# Patient Record
Sex: FEMALE | Race: WHITE | Employment: UNEMPLOYED | URBAN - NONMETROPOLITAN AREA
[De-identification: names, ages, dates, MRNs, and addresses within clinical notes are randomized per-mention and may not be internally consistent; named-entity substitution may affect disease eponyms.]

---

## 2019-07-22 ENCOUNTER — OUTSIDE SERVICES (OUTPATIENT)
Dept: PRIMARY CARE CLINIC | Age: 79
End: 2019-07-22
Payer: MEDICARE

## 2019-07-22 DIAGNOSIS — K21.9 GASTROESOPHAGEAL REFLUX DISEASE, ESOPHAGITIS PRESENCE NOT SPECIFIED: ICD-10-CM

## 2019-07-22 DIAGNOSIS — E78.2 MIXED HYPERLIPIDEMIA: ICD-10-CM

## 2019-07-22 DIAGNOSIS — J45.20 MILD INTERMITTENT ASTHMA WITHOUT COMPLICATION: Primary | ICD-10-CM

## 2019-07-22 DIAGNOSIS — E11.9 TYPE 2 DIABETES MELLITUS WITHOUT COMPLICATION, WITHOUT LONG-TERM CURRENT USE OF INSULIN (HCC): ICD-10-CM

## 2019-07-22 PROCEDURE — 99214 OFFICE O/P EST MOD 30 MIN: CPT | Performed by: FAMILY MEDICINE

## 2019-09-30 ENCOUNTER — OUTSIDE SERVICES (OUTPATIENT)
Dept: PRIMARY CARE CLINIC | Age: 79
End: 2019-09-30
Payer: MEDICARE

## 2019-09-30 DIAGNOSIS — M25.552 PAIN OF LEFT HIP: Primary | ICD-10-CM

## 2019-09-30 DIAGNOSIS — E78.5 HYPERLIPIDEMIA, UNSPECIFIED HYPERLIPIDEMIA TYPE: ICD-10-CM

## 2019-09-30 DIAGNOSIS — I10 ESSENTIAL HYPERTENSION: ICD-10-CM

## 2019-09-30 DIAGNOSIS — E11.9 CONTROLLED TYPE 2 DIABETES MELLITUS WITHOUT COMPLICATION, WITHOUT LONG-TERM CURRENT USE OF INSULIN (HCC): ICD-10-CM

## 2019-09-30 PROCEDURE — 99214 OFFICE O/P EST MOD 30 MIN: CPT | Performed by: FAMILY MEDICINE

## 2019-09-30 NOTE — PROGRESS NOTES
session: Not on file    Stress: Not on file   Relationships    Social connections:     Talks on phone: Not on file     Gets together: Not on file     Attends Mosque service: Not on file     Active member of club or organization: Not on file     Attends meetings of clubs or organizations: Not on file     Relationship status: Not on file    Intimate partner violence:     Fear of current or ex partner: Not on file     Emotionally abused: Not on file     Physically abused: Not on file     Forced sexual activity: Not on file   Other Topics Concern    Not on file   Social History Narrative    Not on file     Past Surgical History:   Procedure Laterality Date    CATARACT REMOVAL WITH IMPLANT      left x 2    EYE SURGERY  2/21/2012    ppv,laser,gas fluid exchange left    HYSTERECTOMY  1980's    WRIST SURGERY      right      No family history on file. There were no vitals filed for this visit. Exam: Const: Appears healthy and well developed. No signs of acute distress present. Eyes: PERRL  ENMT: Tympanic membranes are intact. Nasal mucosa intact without noted erythema Septum is in the midline. Posterior pharynx shows no exudate, irritation or redness. Neck:  Supple without adenopathy. Adequate range of motion   Resp: No rales, rhonchi, wheezes appreciated over the lungs bilaterally. CV: S1, S2 within normal limits. Regular rate and rhythm noted. Without murmur, gallop or rub. Extremities:  Pulses intact. Without noted edema. Abdomen: Positive bowel sounds. Palpation reveals softness, with no distension, organomegaly or tenderness. No abdominal masses palpable. Skin: Skin is warm and dry. Musculo: Unchanged upon examination  Neuro: Alert and oriented X3. Cranial nerves grossly intact. Psych: Mood is normal.  Affect is normal.   Vital signs reviewed. Controlled Substances Monitoring:     No flowsheet data found.      Plan Per Assessment:  Kathleen Phan was seen today for joint

## 2019-10-11 DIAGNOSIS — M25.552 PAIN OF LEFT HIP: ICD-10-CM

## 2019-11-04 ENCOUNTER — OUTSIDE SERVICES (OUTPATIENT)
Dept: PRIMARY CARE CLINIC | Age: 79
End: 2019-11-04
Payer: MEDICARE

## 2019-11-04 DIAGNOSIS — R42 VERTIGO: Primary | ICD-10-CM

## 2019-11-04 PROCEDURE — 99213 OFFICE O/P EST LOW 20 MIN: CPT | Performed by: FAMILY MEDICINE

## 2020-01-20 ENCOUNTER — OUTSIDE SERVICES (OUTPATIENT)
Dept: FAMILY MEDICINE CLINIC | Age: 80
End: 2020-01-20
Payer: MEDICARE

## 2020-01-20 PROCEDURE — 99214 OFFICE O/P EST MOD 30 MIN: CPT | Performed by: FAMILY MEDICINE

## 2020-01-20 NOTE — PROGRESS NOTES
2020     Lorayne Carrel    : 1940 Sex: female   Age: [de-identified] y.o. Chief Complaint   Patient presents with    Diabetes    Asthma    Hyperlipidemia    Gastroesophageal Reflux       HPI: This [de-identified]y.o. -year-old female  presents today for evaluation and management of her  chronic medical problems. Current medication list reviewed. The patient is tolerating all medications well without adverse events or known side effects. The patient does understand the risk and benefits of the prescribed medications. The patient is up-to-date on all age-appropriate wellness issues. The patient states she recently saw her dentist and has a pending tooth extraction versus root canal.      ROS:   Const: Denies changes in appetite, chills, fever, night sweats and weight loss. Eyes:  Denies discharge, a recent change in visual acuity, blurred vision and double vision. ENMT: Denies discharge of the ears, hearing loss, pain of the ears. Denies nasal or sinus symptoms other than stated above. Denies mouth or throat symptoms. CV:  Denies chest pain, dyspnea on exertion, orthopnea, palpitations and PND  Resp: Denies chest pain, cough, SOB and wheezing. GI: Denies abdominal pain, constipation, diarrhea, heartburn, indigestion, nausea and vomiting. : Denies dysuria, frequency, hematuria, nocturia and urgency. Musculo: Denies arthralgias and myalgia  Skin:  Denies lesions, pruritus and rash. Neuro: Denies dizziness, lightheadedness, numbness, tingling and weakness. Psych:  Denies anxiety and depression  Endocrine: Denies anxiety and depression. Hema/Lymph: Denies hematologic symptoms  Allergy/Immuno:  Denies allergic/immunologic symptoms. Pertinent positives reviewed and noted      Current Outpatient Medications:     meclizine (ANTIVERT) 12.5 MG tablet, Take 12.5 mg by mouth 3 times daily as needed, Disp: , Rfl:     albuterol (VENTOLIN HFA) 108 (90 BASE) MCG/ACT inhaler, Inhale 2 puffs into the lungs 2 times daily. Physical activity:     Days per week: Not on file     Minutes per session: Not on file    Stress: Not on file   Relationships    Social connections:     Talks on phone: Not on file     Gets together: Not on file     Attends Anabaptism service: Not on file     Active member of club or organization: Not on file     Attends meetings of clubs or organizations: Not on file     Relationship status: Not on file    Intimate partner violence:     Fear of current or ex partner: Not on file     Emotionally abused: Not on file     Physically abused: Not on file     Forced sexual activity: Not on file   Other Topics Concern    Not on file   Social History Narrative    Not on file     Past Surgical History:   Procedure Laterality Date    CATARACT REMOVAL WITH IMPLANT      left x 2    EYE SURGERY  2/21/2012    ppv,laser,gas fluid exchange left    HYSTERECTOMY  1980's    WRIST SURGERY      right      No family history on file. There were no vitals filed for this visit. Exam: Const: Appears healthy and well developed. No signs of acute distress present. Eyes: PERRL  ENMT: Tympanic membranes are intact. Nasal mucosa intact without noted erythema Septum is in the midline. Posterior pharynx shows no exudate, irritation or redness. Neck:  Supple without adenopathy. Adequate range of motion   Resp: No rales, rhonchi, wheezes appreciated over the lungs bilaterally. CV: S1, S2 within normal limits. Regular rate and rhythm noted. Without murmur, gallop or rub. Extremities:  Pulses intact. Without noted edema. Abdomen: Positive bowel sounds. Palpation reveals softness, with no distension, organomegaly or tenderness. No abdominal masses palpable. Skin: Skin is warm and dry. Musculo: Unchanged upon examination. Neuro: Alert and oriented X3. Cranial nerves grossly intact. Psych: Mood is normal.  Affect is normal.   Vital signs reviewed. Controlled Substances Monitoring:     No flowsheet data found.

## 2020-04-20 ENCOUNTER — VIRTUAL VISIT (OUTPATIENT)
Dept: PRIMARY CARE CLINIC | Age: 80
End: 2020-04-20
Payer: MEDICARE

## 2020-04-20 PROCEDURE — G2012 BRIEF CHECK IN BY MD/QHP: HCPCS | Performed by: FAMILY MEDICINE

## 2020-04-20 RX ORDER — MONTELUKAST SODIUM 10 MG/1
TABLET ORAL
COMMUNITY
Start: 2020-04-11 | End: 2020-07-20 | Stop reason: SDUPTHER

## 2020-04-20 RX ORDER — DILTIAZEM HYDROCHLORIDE 60 MG/1
TABLET, FILM COATED ORAL
COMMUNITY
Start: 2020-04-06 | End: 2020-07-20 | Stop reason: SDUPTHER

## 2020-04-20 RX ORDER — MIRABEGRON 25 MG/1
TABLET, FILM COATED, EXTENDED RELEASE ORAL
COMMUNITY
Start: 2020-04-18 | End: 2020-07-20 | Stop reason: SDUPTHER

## 2020-04-20 ASSESSMENT — PATIENT HEALTH QUESTIONNAIRE - PHQ9
SUM OF ALL RESPONSES TO PHQ QUESTIONS 1-9: 2
SUM OF ALL RESPONSES TO PHQ QUESTIONS 1-9: 2
1. LITTLE INTEREST OR PLEASURE IN DOING THINGS: 1
2. FEELING DOWN, DEPRESSED OR HOPELESS: 1
SUM OF ALL RESPONSES TO PHQ9 QUESTIONS 1 & 2: 2
DEPRESSION UNABLE TO ASSESS: FUNCTIONAL CAPACITY MOTIVATION LIMITS ACCURACY

## 2020-04-20 NOTE — PROGRESS NOTES
Zenaida Garibay is a 79 y.o. female evaluated via telephone on 4/20/2020. Consent:  She and/or health care decision maker is aware that that she may receive a bill for this telephone service, depending on her insurance coverage, and has provided verbal consent to proceed: Yes      Documentation:  I communicated with the patient and/or health care decision maker about hypertension, hyperlipidemia, diabetes and rash. .   Details of this discussion including any medical advice provided: This 79-year-old female presents today for follow-up evaluation of her chronic medical problems including those noted as above. The patient also states she is having issues with a rash of the bilateral foot region. The patient is requesting a podiatry referral.  The patient denies any associated pruritus. Current medication list reviewed. The patient is tolerating all medications well without adverse events or known side effects. The patient is up-to-date on all age-appropriate wellness issues. Assessment #1 hypertension #2 hyperlipidemia #3 diabetes number 4 foot dermatitis plan #1 podiatry referral #2 schedule office visit follow-up in 3 months. I affirm this is a Patient Initiated Episode with an Established Patient who has not had a related appointment within my department in the past 7 days or scheduled within the next 24 hours. Total Time: minutes: 5-10 minutes.     Note: not billable if this call serves to triage the patient into an appointment for the relevant concern      Taqueria Alberto

## 2020-07-20 ENCOUNTER — VIRTUAL VISIT (OUTPATIENT)
Dept: PRIMARY CARE CLINIC | Age: 80
End: 2020-07-20
Payer: MEDICARE

## 2020-07-20 PROCEDURE — 99442 PR PHYS/QHP TELEPHONE EVALUATION 11-20 MIN: CPT | Performed by: FAMILY MEDICINE

## 2020-07-20 RX ORDER — ALBUTEROL SULFATE 90 UG/1
2 AEROSOL, METERED RESPIRATORY (INHALATION) 2 TIMES DAILY
Qty: 1 INHALER | Refills: 5 | Status: SHIPPED
Start: 2020-07-20 | End: 2021-06-01

## 2020-07-20 RX ORDER — MIRABEGRON 25 MG/1
25 TABLET, FILM COATED, EXTENDED RELEASE ORAL DAILY
Qty: 30 TABLET | Refills: 5 | Status: SHIPPED | OUTPATIENT
Start: 2020-07-20 | End: 2020-08-19

## 2020-07-20 RX ORDER — DILTIAZEM HYDROCHLORIDE 60 MG/1
2 TABLET, FILM COATED ORAL DAILY
Qty: 1 INHALER | Refills: 5 | Status: SHIPPED
Start: 2020-07-20 | End: 2021-06-01

## 2020-07-20 RX ORDER — MONTELUKAST SODIUM 10 MG/1
10 TABLET ORAL NIGHTLY
Qty: 30 TABLET | Refills: 5 | Status: SHIPPED
Start: 2020-07-20 | End: 2021-06-01

## 2020-07-20 RX ORDER — MECLIZINE HCL 12.5 MG/1
12.5 TABLET ORAL 3 TIMES DAILY PRN
Qty: 90 TABLET | Refills: 5 | Status: SHIPPED | OUTPATIENT
Start: 2020-07-20 | End: 2020-08-19

## 2020-07-20 NOTE — PROGRESS NOTES
Samuel Doyle is a 79 y.o. female evaluated via telephone on 7/20/2020. Consent:  She and/or health care decision maker is aware that that she may receive a bill for this telephone service, depending on her insurance coverage, and has provided verbal consent to proceed: Yes      Documentation:  I communicated with the patient and/or health care decision maker about evaluation management of chronic medical problems. Details of this discussion including any medical advice provided: This 76-year-old female presents today for follow-up evaluation and management of her chronic medical problems. Current medication list reviewed. The patient is tolerating all medications well without adverse events or known side effects. The patient is not up-to-date on all age-appropriate wellness issues. Assessment #1 diabetes #2 GERD #3 hyperlipidemia plan #1 continue current medical therapy #2 schedule office visit follow-up in 3 months. I affirm this is a Patient Initiated Episode with a Patient who has not had a related appointment within my department in the past 7 days or scheduled within the next 24 hours. Patient identification was verified at the start of the visit: Yes    Total Time: minutes: 11-20 minutes.   Note: not billable if this call serves to triage the patient into an appointment for the relevant concern      Ruddy Dykes

## 2020-12-01 LAB
AVERAGE GLUCOSE: NORMAL
BASOPHILS ABSOLUTE: NORMAL
BASOPHILS RELATIVE PERCENT: NORMAL
CHOLESTEROL, TOTAL: NORMAL
CHOLESTEROL/HDL RATIO: NORMAL
EOSINOPHILS ABSOLUTE: NORMAL
EOSINOPHILS RELATIVE PERCENT: NORMAL
HBA1C MFR BLD: 6.4 %
HCT VFR BLD CALC: NORMAL %
HDLC SERPL-MCNC: NORMAL MG/DL
HEMOGLOBIN: NORMAL
LDL CHOLESTEROL CALCULATED: NORMAL
LYMPHOCYTES ABSOLUTE: NORMAL
LYMPHOCYTES RELATIVE PERCENT: NORMAL
MCH RBC QN AUTO: NORMAL PG
MCHC RBC AUTO-ENTMCNC: NORMAL G/DL
MCV RBC AUTO: NORMAL FL
MONOCYTES ABSOLUTE: NORMAL
MONOCYTES RELATIVE PERCENT: NORMAL
NEUTROPHILS ABSOLUTE: NORMAL
NEUTROPHILS RELATIVE PERCENT: NORMAL
NONHDLC SERPL-MCNC: NORMAL MG/DL
PDW BLD-RTO: NORMAL %
PLATELET # BLD: NORMAL 10*3/UL
PMV BLD AUTO: NORMAL FL
RBC # BLD: NORMAL 10*6/UL
TRIGL SERPL-MCNC: NORMAL MG/DL
VLDLC SERPL CALC-MCNC: NORMAL MG/DL
WBC # BLD: NORMAL 10*3/UL

## 2020-12-14 ENCOUNTER — OUTSIDE SERVICES (OUTPATIENT)
Dept: FAMILY MEDICINE CLINIC | Age: 80
End: 2020-12-14
Payer: MEDICARE

## 2020-12-14 PROBLEM — J45.20 MILD INTERMITTENT ASTHMA WITHOUT COMPLICATION: Status: ACTIVE | Noted: 2020-12-14

## 2020-12-14 NOTE — PROGRESS NOTES
2020     Marina Pathak    : 1940 Sex: female   Age: 79 y.o. Chief Complaint   Patient presents with    Hyperlipidemia    Diabetes    Gastroesophageal Reflux    Other       HPI: This 79y.o. -year-old female  presents today for evaluation and management of her  chronic medical problems. Current medication list reviewed. The patient is tolerating all medications well without adverse events or known side effects. The patient does understand the risk and benefits of the prescribed medications. The patient is not up-to-date on all age-appropriate wellness issues. The patient is concerned about cognitive impairment. The patient states she does have trouble finding words. ROS:   Const: Denies changes in appetite, chills, fever, night sweats and weight loss. Eyes:  Denies discharge, a recent change in visual acuity, blurred vision and double vision. ENMT: Denies discharge of the ears, hearing loss, pain of the ears. Denies nasal or sinus symptoms other than stated above. Denies mouth or throat symptoms. CV:  Denies chest pain, dyspnea on exertion, orthopnea, palpitations and PND  Resp: Denies chest pain, cough, SOB and wheezing. GI: Denies abdominal pain, constipation, diarrhea, heartburn, indigestion, nausea and vomiting. : Denies dysuria, frequency, hematuria, nocturia and urgency. Musculo: Denies arthralgias and myalgia  Skin:  Denies lesions, pruritus and rash. Neuro: Denies dizziness, lightheadedness, numbness, tingling and weakness. Psych:  Denies anxiety and depression  Endocrine: Denies anxiety and depression. Hema/Lymph: Denies hematologic symptoms  Allergy/Immuno:  Denies allergic/immunologic symptoms.   Pertinent positives reviewed and noted      Current Outpatient Medications:     montelukast (SINGULAIR) 10 MG tablet, Take 1 tablet by mouth nightly, Disp: 30 tablet, Rfl: 5    SYMBICORT 80-4.5 MCG/ACT AERO, Inhale 2 puffs into the lungs daily, Disp: 1 Inhaler, Rfl: 5    albuterol sulfate HFA (VENTOLIN HFA) 108 (90 Base) MCG/ACT inhaler, Inhale 2 puffs into the lungs 2 times daily Use am of surgery Inhale 2 puffs into the lungs 2 times daily. Use am of surgery, Disp: 1 Inhaler, Rfl: 5    fluticasone-salmeterol (ADVAIR DISKUS) 500-50 MCG/DOSE diskus inhaler, Inhale 1 puff into the lungs daily. Use am of surgery States \"supposed to use twice daily, only uses once\" , Disp: , Rfl:     omeprazole (PRILOSEC) 20 MG capsule, Take 20 mg by mouth daily. Take am of surgery, 02/21/2012, with 1 ounce water , Disp: , Rfl:     theophylline CR, 12 hour, (THEOPHYLLINE CR, 12 HOUR,) 300 MG CR tablet, Take 300 mg by mouth 2 times daily. Take am of surgery, 02/21/2012 , Disp: , Rfl:     loratadine (CLARITIN) 10 MG tablet, Take 10 mg by mouth daily. , Disp: , Rfl:     oxybutynin (DITROPAN-XL) 10 MG CR tablet, Take 10 mg by mouth daily.   , Disp: , Rfl:     simvastatin (ZOCOR) 20 MG tablet, Take 20 mg by mouth nightly.  , Disp: , Rfl:     Allergies   Allergen Reactions    Pcn [Penicillins] Other (See Comments)     Oral form--\"whole insides shook\"    Sulfa Antibiotics Other (See Comments)     seizure       Past Medical History:   Diagnosis Date    Acid reflux     Asthma     Diabetes mellitus ([facility] [address]     diet controlled    Difficulty waking 1980's    postop hysterectomy     Hyperlipidemia     Normal nuclear stress test     per pt, 5 years ago    Skin cancer     face, 4 years ago    Stress incontinence, female     Wears glasses      Social History     Socioeconomic History    Marital status: Single     Spouse name: Not on file    Number of children: Not on file    Years of education: Not on file    Highest education level: Not on file   Occupational History    Not on file   Social Needs    Financial resource strain: Not on file    Food insecurity     Worry: Not on file     Inability: Not on file    Transportation needs     Medical: Not on file     Non-medical: Not on file Tobacco Use    Smoking status: Never Smoker    Smokeless tobacco: Never Used   Substance and Sexual Activity    Alcohol use: No    Drug use: No    Sexual activity: Not on file   Lifestyle    Physical activity     Days per week: Not on file     Minutes per session: Not on file    Stress: Not on file   Relationships    Social connections     Talks on phone: Not on file     Gets together: Not on file     Attends Druze service: Not on file     Active member of club or organization: Not on file     Attends meetings of clubs or organizations: Not on file     Relationship status: Not on file    Intimate partner violence     Fear of current or ex partner: Not on file     Emotionally abused: Not on file     Physically abused: Not on file     Forced sexual activity: Not on file   Other Topics Concern    Not on file   Social History Narrative    Not on file     Past Surgical History:   Procedure Laterality Date    CATARACT REMOVAL WITH IMPLANT      left x 2    EYE SURGERY  2/21/2012    ppv,laser,gas fluid exchange left    HYSTERECTOMY  1980's    WRIST SURGERY      right      History reviewed. No pertinent family history. There were no vitals filed for this visit. Exam: Const: Appears healthy and well developed. No signs of acute distress present. Eyes: PERRL  ENMT: Tympanic membranes are intact. Nasal mucosa intact without noted erythema Septum is in the midline. Posterior pharynx shows no exudate, irritation or redness. Neck:  Supple without adenopathy. Adequate range of motion   Resp: No rales, rhonchi, wheezes appreciated over the lungs bilaterally. CV: S1, S2 within normal limits. Regular rate and rhythm noted. Without murmur, gallop or rub. Extremities:  Pulses intact. Without noted edema. Abdomen: Positive bowel sounds. Palpation reveals softness, with no distension, organomegaly or tenderness. No abdominal masses palpable. Skin: Skin is warm and dry. Musculo:  Unchanged upon examination  Neuro: Alert and oriented X3. Cranial nerves grossly intact. Psych: Mood is normal.  Affect is normal.   Vital signs reviewed. Controlled Substances Monitoring:     No flowsheet data found. Plan Per Assessment:  Veronica  was seen today for hyperlipidemia, diabetes, gastroesophageal reflux and other. Diagnoses and all orders for this visit:    Gastroesophageal reflux disease, unspecified whether esophagitis present    Mild intermittent asthma without complication    Controlled type 2 diabetes mellitus without complication, without long-term current use of insulin (HCC)    Mixed hyperlipidemia    Cognitive impairment      The patient scored a 13 on the verbal fluency test.  The patient will be referred to Dr. Princess Cox for cognitive impairment. Return in about 3 months (around 3/14/2021) for MEDICATION CHECK, FOLLOW UP [address] Sukhi Paniagua MD    Note was generated with the assistance of voice recognition software. Document was reviewed however may contain grammatical errors.

## 2021-01-06 ENCOUNTER — TELEPHONE (OUTPATIENT)
Dept: PRIMARY CARE CLINIC | Age: 81
End: 2021-01-06

## 2021-03-22 ENCOUNTER — OUTSIDE SERVICES (OUTPATIENT)
Dept: PRIMARY CARE CLINIC | Age: 81
End: 2021-03-22
Payer: MEDICARE

## 2021-03-22 DIAGNOSIS — J45.20 MILD INTERMITTENT ASTHMA WITHOUT COMPLICATION: Primary | ICD-10-CM

## 2021-03-22 DIAGNOSIS — K21.9 GASTROESOPHAGEAL REFLUX DISEASE, UNSPECIFIED WHETHER ESOPHAGITIS PRESENT: ICD-10-CM

## 2021-03-22 DIAGNOSIS — E78.2 MIXED HYPERLIPIDEMIA: ICD-10-CM

## 2021-03-22 DIAGNOSIS — E11.9 CONTROLLED TYPE 2 DIABETES MELLITUS WITHOUT COMPLICATION, WITHOUT LONG-TERM CURRENT USE OF INSULIN (HCC): ICD-10-CM

## 2021-03-22 NOTE — PROGRESS NOTES
3/22/2021     Raven Martin    : 1940 Sex: female   Age: 79 y.o. Chief Complaint   Patient presents with    Asthma    Hyperlipidemia    Diabetes    Gastroesophageal Reflux    Other       HPI: This 79y.o. -year-old female  presents today for evaluation and management of her  chronic medical problems. The patient states she did stop her Myrbetriq due to a lack of clinical efficacy. Current medication list reviewed. The patient is tolerating all medications well without adverse events or known side effects. The patient does understand the risk and benefits of the prescribed medications. The patient is up-to-date on all age-appropriate wellness issues. This encounter was conducted via [employer] me. ROS:   Const: Denies changes in appetite, chills, fever, night sweats and weight loss. Eyes:  Denies discharge, a recent change in visual acuity, blurred vision and double vision. ENMT: Denies discharge of the ears, hearing loss, pain of the ears. Denies nasal or sinus symptoms other than stated above. Denies mouth or throat symptoms. CV:  Denies chest pain, dyspnea on exertion, orthopnea, palpitations and PND  Resp: Denies chest pain, cough, SOB and wheezing. GI: Denies abdominal pain, constipation, diarrhea, heartburn, indigestion, nausea and vomiting. : Positive for overactive bladder symptoms. .  Musculo: Denies arthralgias and myalgia  Skin:  Denies lesions, pruritus and rash. Neuro: Denies dizziness, lightheadedness, numbness, tingling and weakness. Psych:  Denies anxiety and depression  Endocrine: Denies polyuria, polydipsia, polyphagia, weight gain, dry skin, constipation, fatigue, cold intolerance, heat intolerance or tremors. Hema/Lymph: Denies hematologic symptoms  Allergy/Immuno:  Denies allergic/immunologic symptoms.   Pertinent positives reviewed and noted      Current Outpatient Medications:     montelukast (SINGULAIR) 10 MG tablet, Take 1 tablet by mouth nightly, Disp: 30 tablet, Rfl: 5    SYMBICORT 80-4.5 MCG/ACT AERO, Inhale 2 puffs into the lungs daily, Disp: 1 Inhaler, Rfl: 5    albuterol sulfate HFA (VENTOLIN HFA) 108 (90 Base) MCG/ACT inhaler, Inhale 2 puffs into the lungs 2 times daily Use am of surgery Inhale 2 puffs into the lungs 2 times daily. Use am of surgery, Disp: 1 Inhaler, Rfl: 5    fluticasone-salmeterol (ADVAIR DISKUS) 500-50 MCG/DOSE diskus inhaler, Inhale 1 puff into the lungs daily. Use am of surgery States \"supposed to use twice daily, only uses once\" , Disp: , Rfl:     omeprazole (PRILOSEC) 20 MG capsule, Take 20 mg by mouth daily. Take am of surgery, 02/21/2012, with 1 ounce water , Disp: , Rfl:     theophylline CR, 12 hour, (THEOPHYLLINE CR, 12 HOUR,) 300 MG CR tablet, Take 300 mg by mouth 2 times daily. Take am of surgery, 02/21/2012 , Disp: , Rfl:     loratadine (CLARITIN) 10 MG tablet, Take 10 mg by mouth daily. , Disp: , Rfl:     oxybutynin (DITROPAN-XL) 10 MG CR tablet, Take 10 mg by mouth daily.   , Disp: , Rfl:     simvastatin (ZOCOR) 20 MG tablet, Take 20 mg by mouth nightly.  , Disp: , Rfl:     Allergies   Allergen Reactions    Pcn [Penicillins] Other (See Comments)     Oral form--\"whole insides shook\"    Sulfa Antibiotics Other (See Comments)     seizure       Past Medical History:   Diagnosis Date    Acid reflux     Asthma     Diabetes mellitus ([facility] [address]     diet controlled    Difficulty waking 1980's    postop hysterectomy     Hyperlipidemia     Normal nuclear stress test     per pt, 5 years ago    Skin cancer     face, 4 years ago    Stress incontinence, female     Wears glasses      Social History     Socioeconomic History    Marital status: Single     Spouse name: Not on file    Number of children: Not on file    Years of education: Not on file    Highest education level: Not on file   Occupational History    Not on file   Social Needs    Financial resource strain: Not on file    Food insecurity     Worry: Not on file Inability: Not on file    Transportation needs     Medical: Not on file     Non-medical: Not on file   Tobacco Use    Smoking status: Never Smoker    Smokeless tobacco: Never Used   Substance and Sexual Activity    Alcohol use: No    Drug use: No    Sexual activity: Not on file   Lifestyle    Physical activity     Days per week: Not on file     Minutes per session: Not on file    Stress: Not on file   Relationships    Social connections     Talks on phone: Not on file     Gets together: Not on file     Attends Mor[employer] service: Not on file     Active member of club or organization: Not on file     Attends meetings of clubs or organizations: Not on file     Relationship status: Not on file    Intimate partner violence     Fear of current or ex partner: Not on file     Emotionally abused: Not on file     Physically abused: Not on file     Forced sexual activity: Not on file   Other Topics Concern    Not on file   Social History Narrative    Not on file     Past Surgical History:   Procedure Laterality Date    CATARACT REMOVAL WITH IMPLANT      left x 2    EYE SURGERY  2/21/2012    ppv,laser,gas fluid exchange left    HYSTERECTOMY  1980's    WRIST SURGERY      right      No family history on file. Exam: Const: Appears healthy and well developed. No signs of acute distress present. Eyes: Unchanged upon examination. Neck: Without visualized mass. Resp: Normal respirations noted. Skin: Skin is warm and dry. Musculo: Unchanged upon examination. Neuro: Alert and oriented X3. Psych: Mood is normal.  Affect is normal.   Vital signs reviewed. Controlled Substances Monitoring:     No flowsheet data found. Plan Per Assessment:  Marcin Marrero was seen today for asthma, hyperlipidemia, diabetes, gastroesophageal reflux and other.     Diagnoses and all orders for this visit:    Mild intermittent asthma without complication    Gastroesophageal reflux disease, unspecified whether esophagitis present    Controlled type 2 diabetes mellitus without complication, without long-term current use of insulin ([address]    Mixed hyperlipidemia        Return in about 3 months (around 6/22/2021) for MEDICATION CHECK, FOLLOW UP CHRONIC MEDICAL PROBLEMS. Jamie Layne MD    Note was generated with the assistance of voice recognition software. Document was reviewed however may contain grammatical errors.

## 2021-06-01 ENCOUNTER — INITIAL CONSULT (OUTPATIENT)
Dept: GERIATRIC MEDICINE | Age: 81
End: 2021-06-01
Payer: MEDICARE

## 2021-06-01 VITALS
DIASTOLIC BLOOD PRESSURE: 74 MMHG | HEIGHT: 65 IN | TEMPERATURE: 97.2 F | RESPIRATION RATE: 16 BRPM | BODY MASS INDEX: 33.61 KG/M2 | HEART RATE: 86 BPM | SYSTOLIC BLOOD PRESSURE: 138 MMHG | WEIGHT: 201.7 LBS

## 2021-06-01 DIAGNOSIS — G31.84 MCI (MILD COGNITIVE IMPAIRMENT): Primary | ICD-10-CM

## 2021-06-01 PROCEDURE — 99212 OFFICE O/P EST SF 10 MIN: CPT | Performed by: INTERNAL MEDICINE

## 2021-06-01 RX ORDER — OMEPRAZOLE 40 MG/1
40 CAPSULE, DELAYED RELEASE ORAL 2 TIMES DAILY
COMMUNITY

## 2021-06-01 RX ORDER — MONTELUKAST SODIUM 10 MG/1
10 TABLET ORAL DAILY
COMMUNITY

## 2021-06-01 RX ORDER — BUDESONIDE AND FORMOTEROL FUMARATE DIHYDRATE 80; 4.5 UG/1; UG/1
2 AEROSOL RESPIRATORY (INHALATION) 2 TIMES DAILY
COMMUNITY

## 2021-06-01 RX ORDER — MECLIZINE HYDROCHLORIDE 25 MG/1
12.5 TABLET ORAL 3 TIMES DAILY PRN
COMMUNITY

## 2021-06-01 SDOH — ECONOMIC STABILITY: FOOD INSECURITY: WITHIN THE PAST 12 MONTHS, THE FOOD YOU BOUGHT JUST DIDN'T LAST AND YOU DIDN'T HAVE MONEY TO GET MORE.: NEVER TRUE

## 2021-06-01 SDOH — ECONOMIC STABILITY: FOOD INSECURITY: WITHIN THE PAST 12 MONTHS, YOU WORRIED THAT YOUR FOOD WOULD RUN OUT BEFORE YOU GOT MONEY TO BUY MORE.: NEVER TRUE

## 2021-06-01 ASSESSMENT — PATIENT HEALTH QUESTIONNAIRE - PHQ9
2. FEELING DOWN, DEPRESSED OR HOPELESS: 1
1. LITTLE INTEREST OR PLEASURE IN DOING THINGS: 1
SUM OF ALL RESPONSES TO PHQ QUESTIONS 1-9: 2
SUM OF ALL RESPONSES TO PHQ QUESTIONS 1-9: 2
SUM OF ALL RESPONSES TO PHQ9 QUESTIONS 1 & 2: 2
SUM OF ALL RESPONSES TO PHQ QUESTIONS 1-9: 2

## 2021-06-01 ASSESSMENT — SOCIAL DETERMINANTS OF HEALTH (SDOH): HOW HARD IS IT FOR YOU TO PAY FOR THE VERY BASICS LIKE FOOD, HOUSING, MEDICAL CARE, AND HEATING?: NOT HARD AT ALL

## 2021-06-01 NOTE — PATIENT INSTRUCTIONS
Patient Education        Preventing Falls: Care Instructions  Your Care Instructions     Getting around your home safely can be a challenge if you have injuries or health problems that make it easy for you to fall. Loose rugs and furniture in walkways are among the dangers for many older people who have problems walking or who have poor eyesight. People who have conditions such as arthritis, osteoporosis, or dementia also have to be careful not to fall. You can make your home safer with a few simple measures. Follow-up care is a key part of your treatment and safety. Be sure to make and go to all appointments, and call your doctor if you are having problems. It's also a good idea to know your test results and keep a list of the medicines you take. How can you care for yourself at home? Taking care of yourself  · You may get dizzy if you do not drink enough water. To prevent dehydration, drink plenty of fluids. Choose water and other caffeine-free clear liquids. If you have kidney, heart, or liver disease and have to limit fluids, talk with your doctor before you increase the amount of fluids you drink. · Exercise regularly to improve your strength, muscle tone, and balance. Walk if you can. Swimming may be a good choice if you cannot walk easily. · Have your vision and hearing checked each year or any time you notice a change. If you have trouble seeing and hearing, you might not be able to avoid objects and could lose your balance. · Know the side effects of the medicines you take. Ask your doctor or pharmacist whether the medicines you take can affect your balance. Sleeping pills or sedatives can affect your balance. · Limit the amount of alcohol you drink. Alcohol can impair your balance and other senses. · Ask your doctor whether calluses or corns on your feet need to be removed. If you wear loose-fitting shoes because of calluses or corns, you can lose your balance and fall.   · Talk to your doctor if you have numbness in your feet. Preventing falls at home  · Remove raised doorway thresholds, throw rugs, and clutter. Repair loose carpet or raised areas in the floor. · Move furniture and electrical cords to keep them out of walking paths. · Use nonskid floor wax, and wipe up spills right away, especially on ceramic tile floors. · If you use a walker or cane, put rubber tips on it. If you use crutches, clean the bottoms of them regularly with an abrasive pad, such as steel wool. · Keep your house well lit, especially Amye Medici, and outside walkways. Use night-lights in areas such as hallways and bathrooms. Add extra light switches or use remote switches (such as switches that go on or off when you clap your hands) to make it easier to turn lights on if you have to get up during the night. · Install sturdy handrails on stairways. · Move items in your cabinets so that the things you use a lot are on the lower shelves (about waist level). · Keep a cordless phone and a flashlight with new batteries by your bed. If possible, put a phone in each of the main rooms of your house, or carry a cell phone in case you fall and cannot reach a phone. Or, you can wear a device around your neck or wrist. You push a button that sends a signal for help. · Wear low-heeled shoes that fit well and give your feet good support. Use footwear with nonskid soles. Check the heels and soles of your shoes for wear. Repair or replace worn heels or soles. · Do not wear socks without shoes on wood floors. · Walk on the grass when the sidewalks are slippery. If you live in an area that gets snow and ice in the winter, sprinkle salt on slippery steps and sidewalks. Preventing falls in the bath  · Install grab bars and nonskid mats inside and outside your shower or tub and near the toilet and sinks. · Use shower chairs and bath benches. · Use a hand-held shower head that will allow you to sit while showering.   · Get into a tub or shower by putting the weaker leg in first. Get out of a tub or shower with your strong side first.  · Repair loose toilet seats and consider installing a raised toilet seat to make getting on and off the toilet easier. · Keep your bathroom door unlocked while you are in the shower. Where can you learn more? Go to https://[employer]iceweb.[employer]. org and sign in to your [employer] account. Enter [phone] in the [employer] box to learn more about \"Preventing Falls: Care Instructions. \"     If you do not have an account, please click on the \"Sign Up Now\" link. Current as of: December 7, 2020               Content Version: 12.8  © 2006-2021 Healthwise, Incorporated. Care instructions adapted under license by [facility]. If you have questions about a medical condition or this instruction, always ask your healthcare professional. [place] [address] any warranty or liability for your use of this information.

## 2021-06-15 NOTE — PROGRESS NOTES
CC Here for memory evaluation    No driving since 0868, no issues   Does own meds but hasn't driven   Able to use microwave  Graduate [employer] in education in primary education   Masters at [employer] up in [facility] in [address] [place] [address] up on [address] [facility] 3rd and  4th grade  Trouble finding words and her forte is English  And M [facility] 25   11 worse  with beginning with the letter F  4/5 5 minute memory  Impression; [facility] in a highly educated lady  Plan; [address] in 6 months if worse may consider [facility]
Chief Complaint   Patient presents with   [facility] Consultation     Referral from Dr Jarrell Lovell re: decline in mental capacity. Resides at [facility] independent living x 9 years.  Immunizations     Pt states she has rcvd both doses of the Pfizer vaccine.  Fall     History of falling, but not in the past year. Uses a cane, and a walker prn. Has dizziness & balance issues -- chronic, per pt.
2

## 2021-10-27 ENCOUNTER — OUTSIDE SERVICES (OUTPATIENT)
Dept: FAMILY MEDICINE CLINIC | Age: 81
End: 2021-10-27
Payer: MEDICARE

## 2021-10-27 DIAGNOSIS — R32 URINARY INCONTINENCE, UNSPECIFIED TYPE: ICD-10-CM

## 2021-10-27 DIAGNOSIS — R05.9 COUGH: ICD-10-CM

## 2021-10-27 DIAGNOSIS — R42 DIZZINESS: ICD-10-CM

## 2021-10-27 DIAGNOSIS — E11.9 CONTROLLED TYPE 2 DIABETES MELLITUS WITHOUT COMPLICATION, WITHOUT LONG-TERM CURRENT USE OF INSULIN (HCC): ICD-10-CM

## 2021-10-27 DIAGNOSIS — E78.2 MIXED HYPERLIPIDEMIA: Primary | ICD-10-CM

## 2021-10-27 NOTE — PROGRESS NOTES
10/27/2021    Pearle Lennox [facility]  1940    This resident is being seen today for a follow-up evaluation visit. 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 She is a 79 y.o. female resident who is being seen today for follow-up evaluation/3-month visit. Resident is fairly alert and oriented, but does have a loss of words and states that she has been seen by Dr. Anshul Vernon in times past.  She does currently have a POA who is a [employer]  by the name of Gabbi West.  Resident states that she has had a history of headaches with which she does utilize Tylenol and has had dizziness with improvement. She describes a chronic cough which is moist with phlegm production as well as constipation from time to time. She denies any current pain, sore throat, chest pain, shortness of breath, nausea or vomiting, diarrhea, dysuria or frequency, fever or chills, falls or syncopal events. She does however admit to complete incontinence of urine and has seen department of urology in times past and has been told that there is essentially nothing else that can be done to help her. We did discuss her labs with which she did have an elevated cholesterol 228 with a triglyceride of 198 and an LDL of 145 she admits that she has been on statin as well as fish oil in times past and they were stopped but she does not remember the reasoning.   I did explain to her that we would need to reevaluate her lipid profile and then furthermore discuss the plan of action thereafter. Medications:  Omeprazole 40 mg twice daily   Symbicort 80/45 2 puffs twice daily  Proair 2 puffs twice daily   Singulair 10 mg daily  Tylenol every 4 hours as needed   vitamin D 5 2000 units daily      Objective     Vital Signs: /69 pulse 78 respirations 18 temperature 96.7 O2 93% weight 198.8 pounds        Physical examination:Skin is essentially warm and dry. HEENT unremarkable. Neck is supple. Heart regular rate and rhythm. No rubs, gallops or murmurs noted. Lungs are clear to auscultation. No evidence of rhonchi, rales, or wheezing. Abdomen is soft, supple and non-tender. Bowel sounds are noted x4 quadrants. No rigidity, guarding or rebound tenderness. Negative Pearson's, negative McBurney's, negative Chris's. Extremities; she does have some degree of venous insufficiency without  true pitting edema. Pulses are adequate. No clubbing  or no cyanosis noted. Neurologically she  is alert and oriented x3. No evidence of paralysis or paresthesias noted. Diagnoses and all orders for this visit:    Mixed hyperlipidemia  Comments:  Repeat lipid panel 11/1/2021 due to elevated cholesterol 228 with triglycerides of 198    Cough  Comments:  Chronic in nature with underlying history of asthma with resident currently maintaining Symbicort with ProAir and Singulair    Dizziness  Comments:  Improving    Controlled type 2 diabetes mellitus without complication, without long-term current use of insulin ([facility])  Comments:  Dietary controlled    Urinary incontinence, unspecified type  Comments:  Seen in conjunction with urology in times past          Plan:  Plan of care was discussed with the healthcare team with medications and labs reviewed. Labs recently noted with a BUN/creatinine 24/0.78 with a GFR of 71 hemoglobin A1c of 6.4 TSH 4.06 H/H 14.2/41.7 vitamin D 51 previously of 27 cholesterol 228 HDL 49 triglycerides 198 .   Given the fact that she has not had lipid work-up since July, I am been to recommend a CBC, CMP, lipid panel and a hemoglobin A1c on 11/1/2021. I will plan to follow-up with her in the course of 3 months, pending the lab results. If her lipid panel is noted to be high, then I would like an appointment sooner to discuss plan of care/treatment. This was furthermore discussed with the resident and she was in agreements. I will furthermore maintain her plan of care at this time and plan to see her routinely and as needed with further orders forthcoming. LOVELY VINCENT, APRN - CNP      *Note was creating using voice recognition software.   The document was reviewed however grammatical errors may exist.

## 2022-01-27 ENCOUNTER — OUTSIDE SERVICES (OUTPATIENT)
Dept: FAMILY MEDICINE CLINIC | Age: 82
End: 2022-01-27
Payer: MEDICARE

## 2022-01-27 DIAGNOSIS — R23.4 THICKENING OF SKIN: ICD-10-CM

## 2022-01-27 DIAGNOSIS — R05.9 COUGH: Primary | ICD-10-CM

## 2022-01-27 DIAGNOSIS — E11.9 CONTROLLED TYPE 2 DIABETES MELLITUS WITHOUT COMPLICATION, WITHOUT LONG-TERM CURRENT USE OF INSULIN (HCC): ICD-10-CM

## 2022-01-27 DIAGNOSIS — E78.2 MIXED HYPERLIPIDEMIA: ICD-10-CM

## 2022-01-27 DIAGNOSIS — J45.909 UNCOMPLICATED ASTHMA, UNSPECIFIED ASTHMA SEVERITY, UNSPECIFIED WHETHER PERSISTENT: ICD-10-CM

## 2022-03-02 ENCOUNTER — OUTSIDE SERVICES (OUTPATIENT)
Dept: FAMILY MEDICINE CLINIC | Age: 82
End: 2022-03-02
Payer: MEDICARE

## 2022-03-02 DIAGNOSIS — K21.9 GASTROESOPHAGEAL REFLUX DISEASE, UNSPECIFIED WHETHER ESOPHAGITIS PRESENT: ICD-10-CM

## 2022-03-02 DIAGNOSIS — R26.89 IMBALANCE: Primary | ICD-10-CM

## 2022-03-02 DIAGNOSIS — J30.9 ALLERGIC RHINITIS, UNSPECIFIED SEASONALITY, UNSPECIFIED TRIGGER: ICD-10-CM

## 2022-03-02 DIAGNOSIS — H91.92 HEARING LOSS OF LEFT EAR, UNSPECIFIED HEARING LOSS TYPE: ICD-10-CM

## 2022-03-02 DIAGNOSIS — J45.20 MILD INTERMITTENT ASTHMA WITHOUT COMPLICATION: ICD-10-CM

## 2022-04-19 ENCOUNTER — TELEPHONE (OUTPATIENT)
Dept: GERIATRIC MEDICINE | Age: 82
End: 2022-04-19

## 2022-04-19 NOTE — TELEPHONE ENCOUNTER
As FYI -- Pt was seen only once, on consult in June of 2021. She called to cancel her upcoming May OV appt. States she does not wish to reschedule, wants to find a DR closer to home. Informed pt that she may call to reschedule later if she so desires.

## 2022-06-02 ENCOUNTER — OUTSIDE SERVICES (OUTPATIENT)
Dept: FAMILY MEDICINE CLINIC | Age: 82
End: 2022-06-02
Payer: MEDICARE

## 2022-06-02 DIAGNOSIS — R53.83 OTHER FATIGUE: ICD-10-CM

## 2022-06-02 DIAGNOSIS — R41.89 COGNITIVE IMPAIRMENT: ICD-10-CM

## 2022-06-02 DIAGNOSIS — E78.2 MIXED HYPERLIPIDEMIA: Primary | ICD-10-CM

## 2022-06-02 DIAGNOSIS — R32 URINARY INCONTINENCE, UNSPECIFIED TYPE: ICD-10-CM

## 2022-06-02 DIAGNOSIS — E11.9 CONTROLLED TYPE 2 DIABETES MELLITUS WITHOUT COMPLICATION, WITHOUT LONG-TERM CURRENT USE OF INSULIN (HCC): ICD-10-CM

## 2022-06-02 NOTE — PROGRESS NOTES
6/2/2022    Debbie Jack [facility]  1940    This resident is being seen today for a follow-up evaluation visit. 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 She is a 79 y.o. female resident who is being seen today for follow-up evaluation with resident indicating that she is so tired that she feels that she just cannot get anything accomplished. I did bring up the possibility of assisted living, which she absolutely refused at this time. She does indicate that she did have a physical therapy for approximately 3 weeks but she feels that it was of no help and admits that it is \"mostly in her head. \"  She denies any headaches or dizziness, sore throat, chest pain, coughing or shortness of breath, nausea or vomiting, constipation or diarrhea, dysuria or frequency, fever or chills, falls or syncopal events. Medications:  Omeprazole 40 mg twice daily   Advair 250/50 micrograms 1 inhalation twice daily   Proair 2 puffs twice daily   Singulair 10 mg daily  Tylenol every 4 hours as needed   vitamin D 5 2000 units daily  Tricor 145 mg daily  Zyrtec 10 mg daily as needed        Objective     Vital Signs: /68 pulse 85 respirations 18 temperature 96.8 O2 95% weight 195.8 pounds        Physical examination:Skin is essentially warm and dry. HEENT unremarkable. Neck is supple. Heart regular rate and rhythm. No rubs, gallops or murmurs noted. Lungs are clear to auscultation.   No evidence of rhonchi, rales, or wheezing. Abdomen is soft, supple and non-tender. Bowel sounds are noted x4 quadrants. No rigidity, guarding or rebound tenderness. Negative Pearson's, negative McBurney's, negative Chris's. Extremities; she does have some degree of venous insufficiency without  true pitting edema. She does have some degree of hemosiderin deposition to the bilateral lower extremities. Pulses are adequate. No clubbing  or no cyanosis noted. Neurologically she  is alert and oriented x3. No evidence of paralysis or paresthesias noted. Diagnoses and all orders for this visit:    Mixed hyperlipidemia  Comments:  Discontinue omega-3 and initiate Tricor 145 mg daily and repeat a lipid panel in 3 months    Other fatigue  Comments:  Obtain TSH with B12 and iron studies    Controlled type 2 diabetes mellitus without complication, without long-term current use of insulin ([facility])  Comments:  Stable at this time    Urinary incontinence, unspecified type  Comments:  Currently asymptomatic    Cognitive impairment  Comments:  Stable          Plan:  Plan of care was discussed with the healthcare team with medications and labs reviewed. Resident did have recent labs with inclusion of a lipid panel with an elevated cholesterol level of 243 previously 233 triglycerides 221 previously 201  previously 152  previously 187 BUN/creatinine 22/0.73 with a GFR 76 hemoglobin A1c of 6.5H/H14.3/41.8 with a vitamin D of 70. Given the fact that she did have changes with respect to her lipid panel, I do not feel that the omega-3 is effective. She indicates that she is not able to take statins, but she is unsure as to the reasoning behind it. She states that Dr. Angela Hernandez had taken her off the medication in years past.  And therefore can recommend discontinuation of the omega-3 and initiate Tricor 145 mg daily. I will plan to repeat a lipid panel in the course of 3 months with respect to this change.   Due to the complaints of fatigue, I will recommend a CMP, TSH, B12 and iron studies. This will not be completed for the course of 1 month, as I do want to evaluate the CMP with the changes with respect to her Tricor. I will plan to follow-up with her in the course of 3 months otherwise, unless there are changes with respect to her follow-up labs. LOVELY VINCENT, APRN - CNP      *Note was creating using voice recognition software.   The document was reviewed however grammatical errors may exist.

## 2022-06-15 ENCOUNTER — OUTSIDE SERVICES (OUTPATIENT)
Dept: FAMILY MEDICINE CLINIC | Age: 82
End: 2022-06-15
Payer: MEDICARE

## 2022-06-15 DIAGNOSIS — F03.90 PRESENILE DEMENTIA WITHOUT BEHAVIORAL DISTURBANCE (HCC): ICD-10-CM

## 2022-06-15 DIAGNOSIS — K21.9 GASTROESOPHAGEAL REFLUX DISEASE, UNSPECIFIED WHETHER ESOPHAGITIS PRESENT: ICD-10-CM

## 2022-06-15 DIAGNOSIS — J44.9 CHRONIC OBSTRUCTIVE PULMONARY DISEASE, UNSPECIFIED COPD TYPE (HCC): Primary | ICD-10-CM

## 2022-06-15 DIAGNOSIS — N32.81 OVERACTIVE BLADDER: ICD-10-CM

## 2022-06-15 DIAGNOSIS — J45.909 UNCOMPLICATED ASTHMA, UNSPECIFIED ASTHMA SEVERITY, UNSPECIFIED WHETHER PERSISTENT: ICD-10-CM

## 2022-06-15 NOTE — PROGRESS NOTES
6/15/2022    Betsy Fraga [facility]  1940    This resident is being seen today for an acute evaluation visit. 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 She is a 79 y.o. female resident who is being seen today for an acute evaluation visit secondary to concerns regarding her inhaler. This is a resident who was recently given the benefit of Advair Diskus due to the fact that she felt that Symbicort was too expensive. She presents today indicating that the Advair is highly effective but it is also too expensive. While the resident was present, the nurse did call her pharmacy and discussed this further in terms of price options. It turns out that this was just secondary to her deductible and that the medication has now been to be $30 a month from this point forward until the end of the year. This was furthermore discussed with the resident. She does indicate that this type of weather and when the air conditioner is running that it exacerbates her asthma. She denies any current chest pain, nausea or vomiting, constipation or diarrhea, dysuria or frequency, fever or chills, falls or syncopal events.             Medications:  Omeprazole 40 mg twice daily   Advair 250/50 micrograms 1 inhalation twice daily   Proair 2 puffs twice daily   Singulair 10 mg daily  Tylenol every 4 hours as needed   vitamin D 5 2000 units daily  Tricor 145 mg daily  Zyrtec 10 mg daily as needed        Objective     Vital Signs: /53 pulse 89 respirations 18 temperature 97.3 O2 95% weight 197.8 pounds        Physical examination:Skin is essentially warm and dry. HEENT unremarkable. Neck is supple. Heart regular rate and rhythm. No rubs, gallops or murmurs noted. Lungs are clear to auscultation. No evidence of rhonchi, rales, or wheezing. Abdomen is soft, supple and non-tender. Bowel sounds are noted x4 quadrants. No rigidity, guarding or rebound tenderness. Negative Pearson's, negative McBurney's, negative Chris's. Extremities; she does have some degree of venous insufficiency without  true pitting edema. She does have some degree of hemosiderin deposition to the bilateral lower extremities. Pulses are adequate. No clubbing  or no cyanosis noted. Neurologically she  is alert and oriented x3. No evidence of paralysis or paresthesias noted. 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 So after ongoing discussion with this resident, she has agreed to maintain the benefit of the Advair given the fact that it will only be $30 a month. As stated, she needed to meet her deductible, which is the reason that her previous pricing was $300. I did explain to her that she will pay $30 a month until the end of the month, and then at that time she will have to meet her deductible again. She did voice understanding in this regard.   She will furthermore be scheduled to have her shingles vaccines and keep her follow-up appointment for the course of the next 3 months. She will otherwise continue with her plan of care as stated and I will see her routinely and as needed with further orders forthcoming. LOVELY VINCENT, APRN - CNP      *Note was creating using voice recognition software.   The document was reviewed however grammatical errors may exist.

## 2022-09-21 ENCOUNTER — OUTSIDE SERVICES (OUTPATIENT)
Dept: FAMILY MEDICINE CLINIC | Age: 82
End: 2022-09-21
Payer: MEDICARE

## 2022-09-21 DIAGNOSIS — N32.81 OVERACTIVE BLADDER: ICD-10-CM

## 2022-09-21 DIAGNOSIS — G47.00 INSOMNIA, UNSPECIFIED TYPE: Primary | ICD-10-CM

## 2022-09-21 DIAGNOSIS — K21.9 GASTROESOPHAGEAL REFLUX DISEASE, UNSPECIFIED WHETHER ESOPHAGITIS PRESENT: ICD-10-CM

## 2022-09-21 DIAGNOSIS — R41.89 COGNITIVE IMPAIRMENT: ICD-10-CM

## 2022-09-21 DIAGNOSIS — R26.89 IMBALANCE: ICD-10-CM

## 2022-09-21 NOTE — PROGRESS NOTES
134/79 pulse 95 respirations 18 temperature 96.5 O2 96 % weight 200.4 pounds        Physical examination:Skin is essentially warm and dry. HEENT unremarkable. Neck is supple. Heart regular rate and rhythm. No rubs, gallops or murmurs noted. Lungs are clear to auscultation. No evidence of rhonchi, rales, or wheezing. Abdomen is soft, supple and non-tender. Bowel sounds are noted x4 quadrants. No rigidity, guarding or rebound tenderness. Negative Pearson's, negative McBurney's, negative Chris's. Extremities; she does have some degree of venous insufficiency without  true pitting edema. She does have some degree of hemosiderin deposition to the bilateral lower extremities. Pulses are adequate. No clubbing  or no cyanosis noted. Neurologically she  is alert and oriented x3. No evidence of paralysis or paresthesias noted. Diagnoses and all orders for this visit:    Insomnia, unspecified type  Comments:  Initiate Tylenol PM at bedtime as needed    Imbalance  Comments:  Status post physical therapy services. Maintain utilization of walker. Gastroesophageal reflux disease, unspecified whether esophagitis present  Comments:  Maintain omeprazole    Cognitive impairment  Comments:  Currently stable    Overactive bladder  Comments:  Currently relatively controlled without complaints at this time          Plan:  Plan of care was discussed with the healthcare team with medications and labs reviewed. Regarding what appears to be some underlying insomnia, I did recommend that she have the benefit of Tylenol PM as needed. She has otherwise been relatively stable with her current medical regimen. I will also recommend a refill of her ProAir with 2 puffs twice daily and her Advair 250/50 mcg inhalation twice daily, both of which will be called into [facility] pharmacy. I will plan to follow-up with her in the course of 1 month to reevaluate her insomnia.   We will otherwise continue forth with her current plan of care as stated and see her routinely and as needed with further orders forthcoming. LOVELY VINCENT, APRN - CNP      *Note was creating using voice recognition software.   The document was reviewed however grammatical errors may exist.

## 2022-10-20 ENCOUNTER — OUTSIDE SERVICES (OUTPATIENT)
Dept: FAMILY MEDICINE CLINIC | Age: 82
End: 2022-10-20
Payer: MEDICARE

## 2022-10-20 DIAGNOSIS — G47.00 INSOMNIA, UNSPECIFIED TYPE: Primary | ICD-10-CM

## 2022-10-20 DIAGNOSIS — R40.0 DAYTIME SLEEPINESS: ICD-10-CM

## 2022-10-20 DIAGNOSIS — N32.81 OVERACTIVE BLADDER: ICD-10-CM

## 2022-10-20 DIAGNOSIS — E78.2 MIXED HYPERLIPIDEMIA: ICD-10-CM

## 2022-10-20 DIAGNOSIS — R05.3 CHRONIC COUGH: ICD-10-CM

## 2022-10-20 NOTE — PROGRESS NOTES
10/20/2022    Yanelitunde Cao [facility]  1940    This resident is being seen today for a follow-up evaluation visit. 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 She is a 79 y.o. female resident who is being seen today for a follow-up evaluation visit regarding insomnia. It is of note to mention that she was taking Tylenol PM and she indicates that she sleeps about 3 hours before she has to get up to urinate but she is able to actually fall back asleep, which she was not able to do in times past.  She does however state that she has been relatively sleepy throughout the day. She also states that she has what she would describe as overactive bladder and states that she has been to urologist in times past and has not had any relief with respect to medications. She furthermore brought to my attention that she has had what she would describe as a deep cough, which she feels has been somewhat chronic, but wanted to bring it to my attention. She denies any headaches or dizziness, sore throat, chest pain, nausea or vomiting, constipation or diarrhea, dysuria or frequency, fever or chills, falls or syncopal events.             Medications:  Omeprazole 40 mg twice daily   Advair 250/50 micrograms 1 inhalation twice daily   Proair 2 puffs twice daily   Singulair 10 mg daily  Tylenol every 4 hours as needed   vitamin D 5 2000 units daily  Tricor 145 mg daily  Zyrtec 10 mg daily as needed  Trazodone 25 mg at bedtime  Vesicare 5 mg daily        Objective     Vital Signs: /73 pulse 86 respirations 22 temperature 97.1 O2 95% weight 200.4 pounds        Physical examination:Skin is essentially warm and dry. HEENT unremarkable. Neck is supple. Heart regular rate and rhythm. No rubs, gallops or murmurs noted. Lungs are clear to auscultation. No evidence of rhonchi, rales, or wheezing. Abdomen is soft, supple and non-tender. Bowel sounds are noted x4 quadrants. No rigidity, guarding or rebound tenderness. Negative Pearson's, negative McBurney's, negative Chris's. Extremities; she does have some degree of venous insufficiency without  true pitting edema. She does have some degree of hemosiderin deposition to the bilateral lower extremities. Pulses are adequate. No clubbing  or no cyanosis noted. Neurologically she  is alert and oriented x3. No evidence of paralysis or paresthesias noted. Diagnoses and all orders for this visit:    Insomnia, unspecified type  Comments:  Discontinue Tylenol PM and initiate trazodone 25 mg at bedtime    Overactive bladder  Comments:  Initiate Vesicare 5 mg daily    Chronic cough  Comments:  Possibly due to underlying COPD with recommendations to obtain a chest x-ray    Daytime sleepiness  Comments:  Change time of Zyrtec to at bedtime and discontinue Tylenol PM    Mixed hyperlipidemia  Comments:  Controlled with Tricor            Plan:  Plan of care was discussed with the healthcare team with medications and labs reviewed. Given the fact that she does have some underlying insomnia and she is having some daytime sleepiness, I did asked that she change her Zyrtec to be taken at nighttime. I am also going to discontinue her Tylenol PM given the fact that she is taking the Zyrtec and place her on trazodone 25 mg at bedtime. She indicates that she does not feel that she has been on Vesicare in times past for her overactive bladder so I will start her on 5 mg daily.   I am going to obtain a chest x-ray at this time given the fact that she has not had 1 in extended period of time and she is stating that she has had some degree of a chronic cough. I will otherwise plan to follow-up with her in the course of 1 month for reevaluation. She will furthermore continue with her plan of care as stated and I will see her routinely and as needed with further orders forthcoming. LOVELY VINCENT, APRN - CNP      *Note was creating using voice recognition software.   The document was reviewed however grammatical errors may exist.

## 2022-11-02 ENCOUNTER — OUTSIDE SERVICES (OUTPATIENT)
Dept: FAMILY MEDICINE CLINIC | Age: 82
End: 2022-11-02
Payer: MEDICARE

## 2022-11-02 DIAGNOSIS — N32.81 OVERACTIVE BLADDER: ICD-10-CM

## 2022-11-02 DIAGNOSIS — K21.9 GASTROESOPHAGEAL REFLUX DISEASE, UNSPECIFIED WHETHER ESOPHAGITIS PRESENT: ICD-10-CM

## 2022-11-02 DIAGNOSIS — G47.00 INSOMNIA, UNSPECIFIED TYPE: ICD-10-CM

## 2022-11-02 DIAGNOSIS — J40 BRONCHITIS: Primary | ICD-10-CM

## 2022-11-02 DIAGNOSIS — F03.90 PRESENILE DEMENTIA WITHOUT BEHAVIORAL DISTURBANCE (HCC): ICD-10-CM

## 2022-11-02 NOTE — PROGRESS NOTES
11/2/2022    Devon Edmonds [facility]  1940    This resident is being seen today for a follow-up evaluation visit. 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 She is a 79 y.o. female resident who is being seen today for an acute evaluation visit regarding a follow-up from her office visit 1 week ago regarding insomnia and what appeared to be a cough consistent with underlying rhinitis. She was given the benefit of trazodone at bedtime and had discontinued her Tylenol PM.  She states that she has been taking her Zyrtec accordingly with respect to her underlying rhinitis. She also indicates that she has been taking the Vesicare and feels that it has had some degree of improvement with respect to the fact that she has \"nothing on her pad in the morning. \"  She does admit she is having ongoing deep cough with clear phlegm production but denies shortness of breath or postnasal drip. She furthermore denies any sore throat, chest pain, nausea or vomiting, constipation or diarrhea, dysuria or frequency, fever or chills, falls or syncopal events.           Medications:  Omeprazole 40 mg twice daily   Advair 250/50 micrograms 1 inhalation twice daily   Proair 2 puffs twice daily   Singulair 10 mg daily  Tylenol every 4 hours as needed   vitamin D 5 2000 units daily  Tricor 145 mg daily  Zyrtec 10 mg daily as needed  Trazodone 25 mg at bedtime  Vesicare 5 mg daily        Objective     Vital Signs: /76 pulse 92 respirations 14 temperature 97.1 O2 95% weight 201 pounds        Physical examination:Skin is essentially warm and dry. HEENT unremarkable. Neck is supple. Heart regular rate and rhythm. No rubs, gallops or murmurs noted. Lungs are clear to auscultation. No evidence of rhonchi, rales, or wheezing. Abdomen is soft, supple and non-tender. Bowel sounds are noted x4 quadrants. No rigidity, guarding or rebound tenderness. Negative Pearson's, negative McBurney's, negative Chris's. Extremities; she does have some degree of venous insufficiency without  true pitting edema. She does have some degree of hemosiderin deposition to the bilateral lower extremities. Pulses are adequate. No clubbing  or no cyanosis noted. Neurologically she  is alert and oriented x3. No evidence of paralysis or paresthesias noted. 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 Despite the fact that she is clear to auscultation and her most recent chest x-ray was within normal limits, she is exhibiting signs and symptoms consistent with underlying bronchitis. I will therefore recommend doxycycline 100 mg twice a day for 7 days in addition to a prednisone taper of 30 mg daily for 3 days then 20 mg daily for 3 days then 10 mg daily for 3 days then 5 mg daily for 3 days with ultimate discontinuation. I did asked that she furthermore maintain the benefit of her Zyrtec.   She will otherwise continue with the trazodone for insomnia and the Vesicare for her overactive bladder and I will follow-up with her in 2 weeks regarding ongoing symptomatology. She will otherwise continue forth with her current plan of care as stated and I will see her routinely and as needed with further orders forthcoming. LOVELY VINCENT, APRN - CNP      *Note was creating using voice recognition software.   The document was reviewed however grammatical errors may exist.

## 2022-11-15 ENCOUNTER — OUTSIDE SERVICES (OUTPATIENT)
Dept: FAMILY MEDICINE CLINIC | Age: 82
End: 2022-11-15
Payer: MEDICARE

## 2022-11-15 DIAGNOSIS — E11.9 CONTROLLED TYPE 2 DIABETES MELLITUS WITHOUT COMPLICATION, WITHOUT LONG-TERM CURRENT USE OF INSULIN (HCC): ICD-10-CM

## 2022-11-15 DIAGNOSIS — J44.9 CHRONIC OBSTRUCTIVE PULMONARY DISEASE, UNSPECIFIED COPD TYPE (HCC): ICD-10-CM

## 2022-11-15 DIAGNOSIS — G47.00 INSOMNIA, UNSPECIFIED TYPE: Primary | ICD-10-CM

## 2022-11-15 DIAGNOSIS — E78.2 MIXED HYPERLIPIDEMIA: ICD-10-CM

## 2022-11-15 DIAGNOSIS — J40 BRONCHITIS: ICD-10-CM

## 2022-11-15 PROCEDURE — 99214 OFFICE O/P EST MOD 30 MIN: CPT | Performed by: NURSE PRACTITIONER

## 2022-11-15 NOTE — PROGRESS NOTES
11/15/2022    [facility]  1940    This resident is being seen today for a follow-up evaluation visit. 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 She is a 79 y.o. female resident who is being seen today for a 2-week follow-up evaluation with which the resident states that she feels that she is getting better and just finished with her doxycycline and prednisone. She does admit to having a cough from time to time, approximately 3-4 times a day. She denies any shortness of breath. She does state that she has some clear phlegm production at times, which she describes as \"normal.\"  She furthermore states that she does not feel that her sleeping pill is effective. She was recently placed on low-dose trazodone. She furthermore denies any complaints of headaches or dizziness, sore throat, nausea or vomiting, constipation or diarrhea, dysuria or frequency, fever or chills, falls or syncopal events. Medications:  Omeprazole 40 mg twice daily   Symbicort 160/4.5 mcg 1 puff twice daily twice daily   Proair 2 puffs twice daily   Singulair 10 mg daily  Tylenol every 4 hours as needed   vitamin D 5 2000 units daily  Tricor 145 mg daily  Zyrtec 10 mg daily as needed  Trazodone 50 mg at bedtime  Vesicare 5 mg daily        Objective     Vital Signs: /75 pulse 85 respirations 22 temperature 97.0 O2 94% weight 197.1 pounds        Physical examination:Skin is essentially warm and dry. HEENT unremarkable. Neck is supple. Heart regular rate and rhythm. No rubs, gallops or murmurs noted. Lungs are clear to auscultation. No evidence of rhonchi, rales, or wheezing. Abdomen is soft, supple and non-tender. Bowel sounds are noted x4 quadrants. No rigidity, guarding or rebound tenderness. Negative Pearson's, negative McBurney's, negative Chris's. Extremities; she does have some degree of venous insufficiency without  true pitting edema. She does have some degree of hemosiderin deposition to the bilateral lower extremities. Pulses are adequate. No clubbing  or no cyanosis noted. Neurologically she  is alert and oriented x3. No evidence of paralysis or paresthesias noted. Diagnoses and all orders for this visit:    Insomnia, unspecified type  Comments:  Upper titrate trazodone to 50 mg at bedtime and follow-up in 1 month    Bronchitis  Comments:  Initiate Symbicort 160/4.5 mcg 1 puff twice daily and maintain ProAir    Chronic obstructive pulmonary disease, unspecified COPD type ([address]    Controlled type 2 diabetes mellitus without complication, without long-term current use of insulin ([address]  Comments:  Currently controlled    Mixed hyperlipidemia  Comments:  Stable with Tricor                Plan:  Plan of care was discussed with the healthcare team with medications and labs reviewed. She has definitely had improvement since my last evaluation with the benefit of antibiotic therapy and steroids. She does however bring to my attention that her Advair is no longer going to be covered so I will recommend Symbicort 160 mcg / 4.5 mcg with 1 puff twice daily. Due to her concern regarding insomnia, I will recommend upward titration of her trazodone to 50 mg at bedtime. She will otherwise continue with her current management as stated and I will see her routinely and as needed with further orders forthcoming. LOVELY VINCENT, APRN - CNP      *Note was creating using voice recognition software.   The document was reviewed however grammatical errors may exist.

## 2022-12-01 ENCOUNTER — OUTSIDE SERVICES (OUTPATIENT)
Dept: FAMILY MEDICINE CLINIC | Age: 82
End: 2022-12-01

## 2022-12-01 DIAGNOSIS — F03.90 PRESENILE DEMENTIA WITHOUT BEHAVIORAL DISTURBANCE (HCC): ICD-10-CM

## 2022-12-01 DIAGNOSIS — G47.00 INSOMNIA, UNSPECIFIED TYPE: ICD-10-CM

## 2022-12-01 DIAGNOSIS — K21.9 GASTROESOPHAGEAL REFLUX DISEASE, UNSPECIFIED WHETHER ESOPHAGITIS PRESENT: ICD-10-CM

## 2022-12-01 DIAGNOSIS — R32 URINARY INCONTINENCE, UNSPECIFIED TYPE: ICD-10-CM

## 2022-12-01 DIAGNOSIS — J40 BRONCHITIS: Primary | ICD-10-CM

## 2022-12-01 NOTE — PROGRESS NOTES
12/1/2022    Tivis Fears  1940    This resident is being seen today for an acute evaluation visit. 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 She is a 79 y.o. female resident who is being seen today for an acute evaluation visit for her request secondary to concerns regarding bronchitis. It is of note to mention that she was treated in the recent weeks past with doxycycline with prednisone and I had followed up with her and she was having improvement. She states that she feels that the cold weather induces her sickness and that is going to be a chronic issue. She does complain of a deep cough with shortness of breath but admits shortness of breath is chronic in nature. She states that she has phlegm production from time to time but states that it is mostly a white mucus. She does indicate she had a sore throat for 1 day but it did resolve. She was recently seen for what appeared to be some underlying insomnia and she has been utilizing trazodone in this respect, although she still feels that it may be ineffective. She currently denies complaints in terms of headache, dizziness, chest pain, nausea or vomiting, constipation or diarrhea, dysuria or frequency, fever or chills, falls or syncopal events.                 Medications:  Omeprazole 40 mg twice daily   Symbicort 160/4.5 mcg 2 puff twice daily twice daily   Proair 2 puffs twice daily   Singulair 10 mg daily  Tylenol every 4 hours as needed vitamin D 5 2000 units daily  Tricor 145 mg daily  Zyrtec 10 mg daily as needed  Trazodone 50 mg at bedtime  Vesicare 5 mg daily        Objective     Vital Signs: /74 pulse 92 respirations 24 temperature 96.5 O2 93% weight 203 pounds        Physical examination:Skin is essentially warm and dry. HEENT unremarkable. Neck is supple. Heart regular rate and rhythm. No rubs, gallops or murmurs noted. Lungs are clear to auscultation. No evidence of rhonchi, rales, or wheezing. Abdomen is soft, supple and non-tender. Bowel sounds are noted x4 quadrants. No rigidity, guarding or rebound tenderness. Negative Pearson's, negative McBurney's, negative Chris's. Extremities; she does have some degree of venous insufficiency without  true pitting edema. She does have some degree of hemosiderin deposition to the bilateral lower extremities. Pulses are adequate. No clubbing  or no cyanosis noted. Neurologically she  is alert and oriented x3. No evidence of paralysis or paresthesias noted. 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 Despite the fact that she does admit to an ongoing cough, her lungs are essentially clear. She does however have a deep cough noted throughout my evaluation.   I will therefore recommend doxycycline 100 mg twice a day for 14 days along with a Medrol Dosepak as directed x1 and to upward titrate her Symbicort to 2 inhalations twice daily. She already has an upcoming appointment scheduled, which she can keep if she is not feeling better. She would otherwise not need to be seen for 3 months. FREDERIC BUTTS - CNP      *Note was creating using voice recognition software.   The document was reviewed however grammatical errors may exist.

## 2023-02-15 ENCOUNTER — OUTSIDE SERVICES (OUTPATIENT)
Dept: FAMILY MEDICINE CLINIC | Age: 83
End: 2023-02-15

## 2023-02-15 DIAGNOSIS — F03.90 PRESENILE DEMENTIA WITHOUT BEHAVIORAL DISTURBANCE (HCC): ICD-10-CM

## 2023-02-15 DIAGNOSIS — G47.00 INSOMNIA, UNSPECIFIED TYPE: Primary | ICD-10-CM

## 2023-02-15 DIAGNOSIS — J44.9 CHRONIC OBSTRUCTIVE PULMONARY DISEASE, UNSPECIFIED COPD TYPE (HCC): ICD-10-CM

## 2023-02-15 DIAGNOSIS — E11.9 CONTROLLED TYPE 2 DIABETES MELLITUS WITHOUT COMPLICATION, WITHOUT LONG-TERM CURRENT USE OF INSULIN (HCC): ICD-10-CM

## 2023-02-15 DIAGNOSIS — E78.2 MIXED HYPERLIPIDEMIA: ICD-10-CM

## 2023-02-15 NOTE — PROGRESS NOTES
2/15/2023    [facility]  1940    This resident is being seen today for a follow-up evaluation visit. 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 She is a 79 y.o. female resident who is being seen today for a follow-up evaluation visit with resident status post Alisha. She states that she had COVID in December and she was given the benefit of a Z-Shawn as well as a Medrol Dosepak and does have what she describes as underlying bronchitis and was also given the benefit of doxycycline with upward titration of her Symbicort. She states that she has some degree of a residual cough and denies shortness of breath but continues to utilize an inhaler. She also indicates that she has wheezing from time to time and we did discuss the benefit of coughing and deep breathing. She also states that regarding her urinary symptoms, but the Niall Route has been working extremely well in this regard. She does have some continued concerns regarding insomnia and states that she does not fall asleep until between 3 and 4 AM.  This is despite treatment with medication. We did furthermore discuss the possibility of checking her blood sugars, given the fact that she did have some degree of elevated blood sugars on recent labs. She states that her mom was a diabetic and that she is very aware of how to check her blood sugar.   However, I did explain to her that I would like to draw labs and determine what treatment would be necessary and that in the meantime I would like her to modify her diet if possible. At this time she otherwise denies any chest pain, nausea or vomiting, constipation or diarrhea, dysuria or frequency, fever or chills, falls or syncopal events. Medications:  Omeprazole 40 mg twice daily   Symbicort 160/4.5 mcg 2 puff twice daily twice daily   Proair 2 puffs twice daily   Singulair 10 mg daily  Tylenol every 4 hours as needed   vitamin D 5 2000 units daily  Tricor 145 mg daily  Zyrtec 10 mg daily as needed  Trazodone 75 mg at bedtime  Vesicare 5 mg daily        Objective     Vital Signs: /69 pulse 98 respirations 24 temperature 96.8 O2 94% weight 199.6 pounds        Physical examination:Skin is essentially warm and dry. HEENT unremarkable. Neck is supple. Heart regular rate and rhythm. No rubs, gallops or murmurs noted. Lungs are clear to auscultation. No evidence of rhonchi, rales, or wheezing. Abdomen is soft, supple and non-tender. Bowel sounds are noted x4 quadrants. No rigidity, guarding or rebound tenderness. Negative Pearson's, negative McBurney's, negative Chris's. Extremities; she does have some degree of venous insufficiency without  true pitting edema. She does have some degree of hemosiderin deposition to the bilateral lower extremities. Pulses are adequate. No clubbing  or no cyanosis noted. Neurologically she  is alert and oriented x3. No evidence of paralysis or paresthesias noted.     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 (HCC)  Comments:  Currently stable with observation    Chronic obstructive pulmonary disease, unspecified COPD type ([facility] [address]  Comments:  Controlled with the benefit of inhalers    Controlled type 2 diabetes mellitus without complication, without long-term current use of insulin [facility])  Comments:  Currently dietary controlled with recommendations for hemoglobin A1c    Mixed hyperlipidemia  Comments:  Controlled with Tricor                    Plan:  Plan of care was discussed with the healthcare team with medications and labs reviewed. Given the fact that this resident does have some ongoing insomnia, I will recommend upward titration of her trazodone to 75 mg at bedtime and to take this between 8 and 9 PM.  As per our discussion, she is going to have some follow-up labs completed with the additional benefit of a hemoglobin A1c, prior to her next visit. I will also recommend a CBC, CMP, vitamin D, lipid panel and a B12 level at that time. We did discuss the benefit of a modified diet with a decrease in carbohydrate and sweet intake. She did however admit that if she would need to have blood sugars monitored, then she would be able to do so on her own. I did explain that we would decide this at her next visit. I will plan to follow-up with her in the course of 1 month with her labs to be completed before that visit. She will otherwise continue with her current management and I will see her routinely and as needed with further orders forthcoming. LOVELY VINCENT, APRN - CNP      *Note was creating using voice recognition software.   The document was reviewed however grammatical errors may exist.

## 2023-03-10 LAB
ALBUMIN SERPL-MCNC: 4.1 G/DL (ref 3.5–5.2)
ALP BLD-CCNC: 66 U/L (ref 35–104)
ALT SERPL-CCNC: 19 U/L (ref 0–32)
ANION GAP SERPL CALCULATED.3IONS-SCNC: 14 MMOL/L (ref 7–16)
AST SERPL-CCNC: 18 U/L (ref 0–31)
BASOPHILS ABSOLUTE: 0.03 E9/L (ref 0–0.2)
BASOPHILS RELATIVE PERCENT: 0.4 % (ref 0–2)
BILIRUB SERPL-MCNC: 0.3 MG/DL (ref 0–1.2)
BUN BLDV-MCNC: 26 MG/DL (ref 6–23)
CALCIUM SERPL-MCNC: 9.5 MG/DL (ref 8.6–10.2)
CHLORIDE BLD-SCNC: 99 MMOL/L (ref 98–107)
CHOLESTEROL, TOTAL: 205 MG/DL (ref 0–199)
CO2: 24 MMOL/L (ref 22–29)
CREAT SERPL-MCNC: 0.7 MG/DL (ref 0.5–1)
EOSINOPHILS ABSOLUTE: 0.21 E9/L (ref 0.05–0.5)
EOSINOPHILS RELATIVE PERCENT: 2.8 % (ref 0–6)
GFR SERPL CREATININE-BSD FRML MDRD: >60 ML/MIN/1.73
GLUCOSE BLD-MCNC: 140 MG/DL (ref 74–99)
HBA1C MFR BLD: 7.7 % (ref 4–5.6)
HCT VFR BLD CALC: 38.9 % (ref 34–48)
HDLC SERPL-MCNC: 51 MG/DL
HEMOGLOBIN: 13.1 G/DL (ref 11.5–15.5)
IMMATURE GRANULOCYTES #: 0.03 E9/L
IMMATURE GRANULOCYTES %: 0.4 % (ref 0–5)
LDL CHOLESTEROL CALCULATED: 126 MG/DL (ref 0–99)
LYMPHOCYTES ABSOLUTE: 1.83 E9/L (ref 1.5–4)
LYMPHOCYTES RELATIVE PERCENT: 24.8 % (ref 20–42)
MCH RBC QN AUTO: 31.9 PG (ref 26–35)
MCHC RBC AUTO-ENTMCNC: 33.7 % (ref 32–34.5)
MCV RBC AUTO: 94.6 FL (ref 80–99.9)
MONOCYTES ABSOLUTE: 0.55 E9/L (ref 0.1–0.95)
MONOCYTES RELATIVE PERCENT: 7.5 % (ref 2–12)
NEUTROPHILS ABSOLUTE: 4.73 E9/L (ref 1.8–7.3)
NEUTROPHILS RELATIVE PERCENT: 64.1 % (ref 43–80)
PDW BLD-RTO: 12.8 FL (ref 11.5–15)
PLATELET # BLD: 215 E9/L (ref 130–450)
PMV BLD AUTO: 9.9 FL (ref 7–12)
POTASSIUM SERPL-SCNC: 4 MMOL/L (ref 3.5–5)
RBC # BLD: 4.11 E12/L (ref 3.5–5.5)
SODIUM BLD-SCNC: 137 MMOL/L (ref 132–146)
TOTAL PROTEIN: 6.8 G/DL (ref 6.4–8.3)
TRIGL SERPL-MCNC: 140 MG/DL (ref 0–149)
VITAMIN B-12: 404 PG/ML (ref 211–946)
VITAMIN D 25-HYDROXY: 62 NG/ML (ref 30–100)
VLDLC SERPL CALC-MCNC: 28 MG/DL
WBC # BLD: 7.4 E9/L (ref 4.5–11.5)

## 2023-03-15 ENCOUNTER — OUTSIDE SERVICES (OUTPATIENT)
Dept: FAMILY MEDICINE CLINIC | Age: 83
End: 2023-03-15

## 2023-03-15 DIAGNOSIS — E78.2 MIXED HYPERLIPIDEMIA: ICD-10-CM

## 2023-03-15 DIAGNOSIS — R41.89 COGNITIVE IMPAIRMENT: ICD-10-CM

## 2023-03-15 DIAGNOSIS — K21.9 GASTROESOPHAGEAL REFLUX DISEASE, UNSPECIFIED WHETHER ESOPHAGITIS PRESENT: ICD-10-CM

## 2023-03-15 DIAGNOSIS — J45.909 UNCOMPLICATED ASTHMA, UNSPECIFIED ASTHMA SEVERITY, UNSPECIFIED WHETHER PERSISTENT: ICD-10-CM

## 2023-03-15 DIAGNOSIS — E11.9 CONTROLLED TYPE 2 DIABETES MELLITUS WITHOUT COMPLICATION, WITHOUT LONG-TERM CURRENT USE OF INSULIN (HCC): Primary | ICD-10-CM

## 2023-03-15 NOTE — PROGRESS NOTES
3/15/2023    Ericyoel Siddiqiin [facility]  1940    This resident is being seen today for a follow-up evaluation visit. 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 She is a 79 y.o. female resident who is being seen today for a 1 month follow-up evaluation with which we did discuss her abnormality of her lab results. Furthermore, we also discussed her insomnia and she states that she has been taking her trazodone every night but not exactly as directed. I had asked her to start taking it between 8 and 9:00 at night but she states that she still takes it later and she tends to sleep until about 11:00 in the morning and she feels that that has just become the normal for her. We did have a long discussion regarding her laboratory work-up given the fact that her glucose level was high at 140 and it has been high on previous results. 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 We did also discuss the elevated cholesterol level, despite her Tricor. She states that she has cut back on some foods, but mostly her sweet intake. She furthermore denies any complaints of headaches or dizziness, sore throat, chest pain, coughing or shortness of breath, nausea or vomiting, constipation or diarrhea, dysuria or frequency, fever or chills, falls or syncopal events.                     Medications:  Atorvastatin 20 mg at bedtime  Omeprazole 40 mg twice daily   Symbicort 160/4.5 mcg 2 puff twice daily twice daily   Proair 2 puffs twice daily   Singulair 10 mg daily  Tylenol every 4 hours as needed   vitamin D 5 2000 units daily  Zyrtec 10 mg daily as needed  Trazodone 75 mg at bedtime  Vesicare 5 mg daily        Objective     Vital Signs: /62 pulse 92 respirations 22 temperature 96.9 O2 95% weight 199.0 pounds        Physical examination:Skin is essentially warm and dry. HEENT unremarkable. Neck is supple. Heart regular rate and rhythm. No rubs, gallops or murmurs noted. Lungs are clear to auscultation. No evidence of rhonchi, rales, or wheezing. Abdomen is soft, supple and non-tender. Bowel sounds are noted x4 quadrants. No rigidity, guarding or rebound tenderness. Negative Pearson's, negative McBurney's, negative Chris's. Extremities; she does have some degree of venous insufficiency without  true pitting edema. She does have some degree of hemosiderin deposition to the bilateral lower extremities. Pulses are adequate. No clubbing  or no cyanosis noted. Neurologically she  is alert and oriented x3. No evidence of paralysis or paresthesias noted.     Diagnoses and all orders for this visit:    Controlled type 2 diabetes mellitus without complication, without long-term current use of insulin ([facility])  Comments:  Obtain glucometer with test strips and lancets and check blood sugar Accu-Cheks fasting, before meals and at bedtime    Mixed hyperlipidemia  Comments:  Discontinue Tricor and initiate atorvastatin 20 mg at bedtime and reevaluate lipid panel in 3 months    Uncomplicated asthma, unspecified asthma severity, unspecified whether persistent  Comments:  Currently stable with Symbicort and ProAir    Cognitive impairment  Comments:  Stable    Gastroesophageal reflux disease, unspecified whether esophagitis present  Comments:  Controlled with omeprazole                      Plan:  Plan of care was discussed with the healthcare team with medications and labs reviewed. Glucose 140 H/H 13.1/38.9 BUN/creatinine 26/0.7 with a GFR greater than 60 cholesterol 205 previously 195  previously 117 hemoglobin A1c of 7.7 B12 level 404 vitamin D of 62. After further discussion with this resident, she does agree to monitoring her blood sugars in the home setting. I will therefore ask that staff order her a glucometer with test strips and lancets and obtain blood sugar Accu-Cheks fasting, before meals and at bedtime. This will most likely be 3 times a day for this resident, she does not get up until 11 AM.  Furthermore, given the fact that she has had an increase in the cholesterol despite her Tricor, I will discontinue the Tricor at this time and initiate atorvastatin 20 mg at bedtime. I will plan to repeat a lipid panel in 3 months along with a CMP. I did furthermore asked that she keep a blood sugar log and bring it to a follow-up appointment in the course of 1 month. She is otherwise can continue with her current management and I will see her routinely and as needed with further orders forthcoming. LOVELY VINCENT, APRN - CNP      *Note was creating using voice recognition software.   The document was reviewed however grammatical errors may exist.

## 2023-05-12 LAB
ALBUMIN SERPL-MCNC: 4.6 G/DL (ref 3.5–5.2)
ALP SERPL-CCNC: 92 U/L (ref 35–104)
ALT SERPL-CCNC: 15 U/L (ref 0–32)
ANION GAP SERPL CALCULATED.3IONS-SCNC: 11 MMOL/L (ref 7–16)
AST SERPL-CCNC: 14 U/L (ref 0–31)
BILIRUB SERPL-MCNC: 0.3 MG/DL (ref 0–1.2)
BUN SERPL-MCNC: 26 MG/DL (ref 6–23)
CALCIUM SERPL-MCNC: 10 MG/DL (ref 8.6–10.2)
CHLORIDE SERPL-SCNC: 101 MMOL/L (ref 98–107)
CO2 SERPL-SCNC: 27 MMOL/L (ref 22–29)
CREAT SERPL-MCNC: 0.8 MG/DL (ref 0.5–1)
GLUCOSE SERPL-MCNC: 112 MG/DL (ref 74–99)
POTASSIUM SERPL-SCNC: 4.4 MMOL/L (ref 3.5–5)
PROT SERPL-MCNC: 7.3 G/DL (ref 6.4–8.3)
SODIUM SERPL-SCNC: 139 MMOL/L (ref 132–146)
TSH SERPL-MCNC: 3.36 UIU/ML (ref 0.27–4.2)

## 2023-05-17 ENCOUNTER — OUTSIDE SERVICES (OUTPATIENT)
Dept: FAMILY MEDICINE CLINIC | Age: 83
End: 2023-05-17

## 2023-05-17 DIAGNOSIS — R73.9 ELEVATED BLOOD SUGAR: Primary | ICD-10-CM

## 2023-05-17 DIAGNOSIS — G47.00 INSOMNIA, UNSPECIFIED TYPE: ICD-10-CM

## 2023-05-17 DIAGNOSIS — R26.89 IMBALANCE: ICD-10-CM

## 2023-05-17 DIAGNOSIS — K21.9 GASTROESOPHAGEAL REFLUX DISEASE, UNSPECIFIED WHETHER ESOPHAGITIS PRESENT: ICD-10-CM

## 2023-05-17 NOTE — PROGRESS NOTES
5/17/2023    [facility]  1940    This resident is being seen today for a follow-up evaluation visit. 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 She is a 79 y.o. female resident who is being seen today for a follow-up visit with which this resident indicates that she still has ongoing balance issues. We did discuss the benefit physical therapy but she states that she has had it in times past and she does not find it to be overly beneficial.  We did discuss her exercising on the floor for help improve her strength and possible stability. She was agreeable to this. I did furthermore review her blood sugars that she has been taken over the course of the past couple weeks and her blood sugar has ranged from 170-230 and it appeared to be worse than it was in times past when she monitored it. She otherwise states that she has been doing well and she denies complaints respect to headaches or dizziness, sore throat, chest pain, coughing or shortness of breath, nausea or vomiting, constipation or diarrhea, fever or chills, falls or syncopal events.                 Medications:  Atorvastatin 20 mg at bedtime  Omeprazole 40 mg twice daily   Symbicort 160/4.5 mcg 2 puff twice daily twice daily   Proair 2 puffs twice daily   Singulair 10 mg daily  Tylenol every 4 hours as needed   vitamin D 5 2000 units daily  Zyrtec 10 mg daily as needed  Trazodone 75 mg at bedtime  Vesicare 5 mg daily  Metformin 5 mg every

## 2023-05-31 LAB
ANION GAP SERPL CALCULATED.3IONS-SCNC: 11 MMOL/L (ref 7–16)
BUN SERPL-MCNC: 19 MG/DL (ref 6–23)
CALCIUM SERPL-MCNC: 10 MG/DL (ref 8.6–10.2)
CHLORIDE SERPL-SCNC: 102 MMOL/L (ref 98–107)
CO2 SERPL-SCNC: 27 MMOL/L (ref 22–29)
CREAT SERPL-MCNC: 0.6 MG/DL (ref 0.5–1)
GLUCOSE SERPL-MCNC: 129 MG/DL (ref 74–99)
POTASSIUM SERPL-SCNC: 4.2 MMOL/L (ref 3.5–5)
SODIUM SERPL-SCNC: 140 MMOL/L (ref 132–146)

## 2023-06-01 ENCOUNTER — OUTSIDE SERVICES (OUTPATIENT)
Dept: FAMILY MEDICINE CLINIC | Age: 83
End: 2023-06-01

## 2023-06-01 DIAGNOSIS — F03.90 PRESENILE DEMENTIA WITHOUT BEHAVIORAL DISTURBANCE (HCC): ICD-10-CM

## 2023-06-01 DIAGNOSIS — K21.9 GASTROESOPHAGEAL REFLUX DISEASE, UNSPECIFIED WHETHER ESOPHAGITIS PRESENT: ICD-10-CM

## 2023-06-01 DIAGNOSIS — R73.9 ELEVATED BLOOD SUGAR: Primary | ICD-10-CM

## 2023-06-01 DIAGNOSIS — N32.81 OVERACTIVE BLADDER: ICD-10-CM

## 2023-06-01 DIAGNOSIS — E78.2 MIXED HYPERLIPIDEMIA: ICD-10-CM

## 2023-07-13 LAB — HBA1C MFR BLD: 7.3 % (ref 4–5.6)

## 2023-07-22 LAB
ALBUMIN SERPL-MCNC: 4.3 G/DL (ref 3.5–5.2)
ALP SERPL-CCNC: 84 U/L (ref 35–104)
ALT SERPL-CCNC: 13 U/L (ref 0–32)
ANION GAP SERPL CALCULATED.3IONS-SCNC: 15 MMOL/L (ref 7–16)
AST SERPL-CCNC: 14 U/L (ref 0–31)
BILIRUB SERPL-MCNC: 0.2 MG/DL (ref 0–1.2)
BUN SERPL-MCNC: 26 MG/DL (ref 6–23)
CALCIUM SERPL-MCNC: 10 MG/DL (ref 8.6–10.2)
CHLORIDE SERPL-SCNC: 101 MMOL/L (ref 98–107)
CO2 SERPL-SCNC: 27 MMOL/L (ref 22–29)
CREAT SERPL-MCNC: 0.7 MG/DL (ref 0.5–1)
GFR SERPL CREATININE-BSD FRML MDRD: >60 ML/MIN/1.73M2
GLUCOSE SERPL-MCNC: 129 MG/DL (ref 74–99)
POTASSIUM SERPL-SCNC: 4.4 MMOL/L (ref 3.5–5)
PROT SERPL-MCNC: 6.8 G/DL (ref 6.4–8.3)
SODIUM SERPL-SCNC: 143 MMOL/L (ref 132–146)

## 2023-09-05 LAB
ALBUMIN SERPL-MCNC: 4.4 G/DL (ref 3.5–5.2)
ALP SERPL-CCNC: 87 U/L (ref 35–104)
ALT SERPL-CCNC: 12 U/L (ref 0–32)
ANION GAP SERPL CALCULATED.3IONS-SCNC: 17 MMOL/L (ref 7–16)
AST SERPL-CCNC: 15 U/L (ref 0–31)
BILIRUB SERPL-MCNC: 0.3 MG/DL (ref 0–1.2)
BUN SERPL-MCNC: 19 MG/DL (ref 6–23)
CALCIUM SERPL-MCNC: 9.4 MG/DL (ref 8.6–10.2)
CHLORIDE SERPL-SCNC: 102 MMOL/L (ref 98–107)
CHOLEST SERPL-MCNC: 154 MG/DL
CO2 SERPL-SCNC: 23 MMOL/L (ref 22–29)
CREAT SERPL-MCNC: 0.7 MG/DL (ref 0.5–1)
GFR SERPL CREATININE-BSD FRML MDRD: >60 ML/MIN/1.73M2
GLUCOSE SERPL-MCNC: 128 MG/DL (ref 74–99)
HBA1C MFR BLD: 7.4 % (ref 4–5.6)
HDLC SERPL-MCNC: 50 MG/DL
LDLC SERPL CALC-MCNC: 63 MG/DL
POTASSIUM SERPL-SCNC: 4.4 MMOL/L (ref 3.5–5)
PROT SERPL-MCNC: 7.2 G/DL (ref 6.4–8.3)
SODIUM SERPL-SCNC: 142 MMOL/L (ref 132–146)
TRIGL SERPL-MCNC: 207 MG/DL
VLDLC SERPL CALC-MCNC: 41 MG/DL

## 2023-09-06 ENCOUNTER — OUTSIDE SERVICES (OUTPATIENT)
Dept: FAMILY MEDICINE CLINIC | Age: 83
End: 2023-09-06

## 2023-09-06 DIAGNOSIS — J44.9 CHRONIC OBSTRUCTIVE PULMONARY DISEASE, UNSPECIFIED COPD TYPE (HCC): ICD-10-CM

## 2023-09-06 DIAGNOSIS — E78.2 MIXED HYPERLIPIDEMIA: Primary | ICD-10-CM

## 2023-09-06 DIAGNOSIS — G47.00 INSOMNIA, UNSPECIFIED TYPE: ICD-10-CM

## 2023-09-06 DIAGNOSIS — E11.9 CONTROLLED TYPE 2 DIABETES MELLITUS WITHOUT COMPLICATION, WITHOUT LONG-TERM CURRENT USE OF INSULIN (HCC): ICD-10-CM

## 2023-09-06 DIAGNOSIS — R53.1 WEAKNESS: ICD-10-CM

## 2023-09-06 NOTE — PROGRESS NOTES
9/6/2023    [facility]  1940    This resident is being seen today for a follow-up evaluation visit. 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 She is a 79 y.o. female resident who is being seen today for a 3-month evaluation with concern regarding the fact that she has decreased stamina with increased weakness. She admits to a sporadic cough but denies shortness of breath. She does states that she has some abdominal discomfort from time to time which she relates to GERD because she states that it is relieved with administration of Tums. She does continue to monitor her blood sugars at home and but they do run between 125 and 149. She furthermore has been following along with Dr. Kimberly Rush and states that she has a follow-up appointment for November 1. She admits that she has ongoing insomnia and normally does not fall asleep until 5 AM and then is awake by 12 PM.  She has had some chronic problems with insomnia in times past.  She denies any headaches or dizziness, sore throat, chest pain, nausea or vomiting, constipation or diarrhea, fever or chills, falls or syncopal events.             Medications:  Atorvastatin 20 mg at bedtime  Omeprazole 40 mg twice daily   Symbicort 160/4.5 mcg 2 puff twice daily twice daily   Proair 2 puffs twice daily   Singulair 10 mg daily  Tylenol every 4 hours as needed   vitamin D 5 2000 units daily  Zyrtec 10 mg daily as needed  Trazodone 75 mg at bedtime  Vesicare 5 mg

## 2023-10-11 ENCOUNTER — OUTSIDE SERVICES (OUTPATIENT)
Dept: FAMILY MEDICINE CLINIC | Age: 83
End: 2023-10-11

## 2023-10-11 DIAGNOSIS — F03.90 PRESENILE DEMENTIA WITHOUT BEHAVIORAL DISTURBANCE (HCC): ICD-10-CM

## 2023-10-11 DIAGNOSIS — R05.9 COUGH, UNSPECIFIED TYPE: ICD-10-CM

## 2023-10-11 DIAGNOSIS — K21.9 GASTROESOPHAGEAL REFLUX DISEASE, UNSPECIFIED WHETHER ESOPHAGITIS PRESENT: ICD-10-CM

## 2023-10-11 DIAGNOSIS — G47.00 INSOMNIA, UNSPECIFIED TYPE: ICD-10-CM

## 2023-10-11 DIAGNOSIS — R53.1 WEAKNESS: Primary | ICD-10-CM

## 2023-10-12 NOTE — PROGRESS NOTES
10/11/2023    Ibisvandana Hernandez  1940    This resident is being seen today for a follow-up evaluation visit. 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 She is a 79 y.o. female resident who is being seen today for a follow-up visit regarding her history of decreased stamina with weakness with which I did recommend therapy services and she states that she has not had a lot of improvement in this regard. I did furthermore discussed with her changing the time of her trazodone administration due to her ongoing insomnia and she states that she feels that the trazodone is effective but states it takes \"5 hours to kick in.\"  She does maintain that she gets good sleep but only sleeps about 2 hours at this time due to her urination. Furthermore, her cough she states has essentially resolved and she does still continue to monitor her blood sugars which she states run between 129 and 155. She denies any headaches or dizziness, sore throat, chest pain, coughing or shortness of breath, nausea or vomiting, constipation or diarrhea, fever or chills, falls or syncopal events.         Medications:  Atorvastatin 20 mg at bedtime  Omeprazole 40 mg twice daily   Symbicort 160/4.5 mcg 2 puff twice daily twice daily   Proair 2 puffs twice daily   Singulair 10 mg daily  Tylenol every 4 hours as needed   vitamin D 5 2000 units daily  Zyrtec 10 mg daily as needed  Trazodone 75 mg in the afternoon  Vesicare 5 mg daily  Metformin 500 mg

## 2024-01-08 LAB
CHOLEST SERPL-MCNC: 195 MG/DL
HBA1C MFR BLD: 7.4 % (ref 4–5.6)
HDLC SERPL-MCNC: 46 MG/DL
LDLC SERPL CALC-MCNC: 98 MG/DL
TRIGL SERPL-MCNC: 256 MG/DL
VLDLC SERPL CALC-MCNC: 51 MG/DL

## 2024-03-25 DIAGNOSIS — M54.50 LUMBAR PAIN: Primary | ICD-10-CM

## 2024-03-26 RX ORDER — HYDROCODONE BITARTRATE AND ACETAMINOPHEN 5; 325 MG/1; MG/1
1 TABLET ORAL 2 TIMES DAILY
Status: CANCELLED | OUTPATIENT
Start: 2024-03-26

## 2024-03-26 RX ORDER — HYDROCODONE BITARTRATE AND ACETAMINOPHEN 5; 325 MG/1; MG/1
1 TABLET ORAL 2 TIMES DAILY
COMMUNITY
End: 2024-03-28 | Stop reason: SDUPTHER

## 2024-03-27 DIAGNOSIS — M19.90 OSTEOARTHRITIS, UNSPECIFIED OSTEOARTHRITIS TYPE, UNSPECIFIED SITE: Primary | ICD-10-CM

## 2024-03-27 RX ORDER — HYDROCODONE BITARTRATE AND ACETAMINOPHEN 5; 325 MG/1; MG/1
1 TABLET ORAL EVERY 8 HOURS PRN
Qty: 180 TABLET | Refills: 0 | Status: SHIPPED
Start: 2024-03-27 | End: 2024-03-30

## 2024-03-27 NOTE — TELEPHONE ENCOUNTER
Refill   Last saw Alicai 10/11/23.   Diagnosis of Polyosteoarthritis is not on pt. Problem list, so I cannot add it.  Please fill in rx quantity and days supply and refill

## 2024-03-28 DIAGNOSIS — M19.90 OSTEOARTHRITIS, UNSPECIFIED OSTEOARTHRITIS TYPE, UNSPECIFIED SITE: Primary | ICD-10-CM

## 2024-03-28 RX ORDER — HYDROCODONE BITARTRATE AND ACETAMINOPHEN 5; 325 MG/1; MG/1
1 TABLET ORAL 2 TIMES DAILY
Qty: 60 TABLET | Refills: 0 | Status: SHIPPED | OUTPATIENT
Start: 2024-03-28 | End: 2024-04-27

## 2024-03-30 RX ORDER — HYDROCODONE BITARTRATE AND ACETAMINOPHEN 5; 325 MG/1; MG/1
1 TABLET ORAL EVERY 8 HOURS PRN
Qty: 30 TABLET | Refills: 0 | Status: SHIPPED | OUTPATIENT
Start: 2024-03-30 | End: 2024-04-29

## 2024-04-11 ENCOUNTER — OUTSIDE SERVICES (OUTPATIENT)
Dept: FAMILY MEDICINE CLINIC | Age: 84
End: 2024-04-11

## 2024-04-11 DIAGNOSIS — Z00.8 ENCOUNTER FOR OTHER GENERAL EXAMINATION: Primary | ICD-10-CM

## 2024-04-22 VITALS
HEART RATE: 85 BPM | WEIGHT: 183.4 LBS | BODY MASS INDEX: 33.75 KG/M2 | SYSTOLIC BLOOD PRESSURE: 141 MMHG | DIASTOLIC BLOOD PRESSURE: 82 MMHG | HEIGHT: 62 IN

## 2024-04-22 LAB
ALBUMIN SERPL-MCNC: 4.1 G/DL (ref 3.5–5.2)
ALP SERPL-CCNC: 51 U/L (ref 35–104)
ALT SERPL-CCNC: 15 U/L (ref 0–32)
ANION GAP SERPL CALCULATED.3IONS-SCNC: 14 MMOL/L (ref 7–16)
AST SERPL-CCNC: 14 U/L (ref 0–31)
BILIRUB SERPL-MCNC: 0.3 MG/DL (ref 0–1.2)
BUN SERPL-MCNC: 18 MG/DL (ref 6–23)
CALCIUM SERPL-MCNC: 9.6 MG/DL (ref 8.6–10.2)
CHLORIDE SERPL-SCNC: 97 MMOL/L (ref 98–107)
CO2 SERPL-SCNC: 24 MMOL/L (ref 22–29)
CREAT SERPL-MCNC: 0.6 MG/DL (ref 0.5–1)
GFR SERPL CREATININE-BSD FRML MDRD: 87 ML/MIN/1.73M2
GLUCOSE SERPL-MCNC: 144 MG/DL (ref 74–99)
POTASSIUM SERPL-SCNC: 3.8 MMOL/L (ref 3.5–5)
PROT SERPL-MCNC: 6.1 G/DL (ref 6.4–8.3)
SODIUM SERPL-SCNC: 135 MMOL/L (ref 132–146)

## 2024-04-22 RX ORDER — ATORVASTATIN CALCIUM 40 MG/1
40 TABLET, FILM COATED ORAL DAILY
COMMUNITY
Start: 2024-03-26

## 2024-04-22 RX ORDER — PANTOPRAZOLE SODIUM 40 MG/1
40 TABLET, DELAYED RELEASE ORAL DAILY
COMMUNITY
Start: 2024-03-26

## 2024-04-22 RX ORDER — PREDNISONE 10 MG/1
5 TABLET ORAL DAILY
COMMUNITY
Start: 2024-03-20

## 2024-04-22 RX ORDER — ERGOCALCIFEROL 1.25 MG/1
50000 CAPSULE ORAL
COMMUNITY
Start: 2024-03-26

## 2024-04-22 RX ORDER — FENOFIBRATE 160 MG/1
160 TABLET ORAL DAILY
COMMUNITY
Start: 2024-03-26

## 2024-04-22 RX ORDER — METOPROLOL SUCCINATE 25 MG/1
25 TABLET, EXTENDED RELEASE ORAL DAILY
COMMUNITY
Start: 2024-03-26

## 2024-04-22 SDOH — ECONOMIC STABILITY: INCOME INSECURITY: HOW HARD IS IT FOR YOU TO PAY FOR THE VERY BASICS LIKE FOOD, HOUSING, MEDICAL CARE, AND HEATING?: NOT VERY HARD

## 2024-04-22 SDOH — ECONOMIC STABILITY: FOOD INSECURITY: WITHIN THE PAST 12 MONTHS, THE FOOD YOU BOUGHT JUST DIDN'T LAST AND YOU DIDN'T HAVE MONEY TO GET MORE.: NEVER TRUE

## 2024-04-22 SDOH — ECONOMIC STABILITY: HOUSING INSECURITY
IN THE LAST 12 MONTHS, WAS THERE A TIME WHEN YOU DID NOT HAVE A STEADY PLACE TO SLEEP OR SLEPT IN A SHELTER (INCLUDING NOW)?: NO

## 2024-04-22 SDOH — ECONOMIC STABILITY: FOOD INSECURITY: WITHIN THE PAST 12 MONTHS, YOU WORRIED THAT YOUR FOOD WOULD RUN OUT BEFORE YOU GOT MONEY TO BUY MORE.: NEVER TRUE

## 2024-04-22 ASSESSMENT — PATIENT HEALTH QUESTIONNAIRE - PHQ9
SUM OF ALL RESPONSES TO PHQ QUESTIONS 1-9: 13
3. TROUBLE FALLING OR STAYING ASLEEP: NEARLY EVERY DAY
SUM OF ALL RESPONSES TO PHQ QUESTIONS 1-9: 13
6. FEELING BAD ABOUT YOURSELF - OR THAT YOU ARE A FAILURE OR HAVE LET YOURSELF OR YOUR FAMILY DOWN: SEVERAL DAYS
SUM OF ALL RESPONSES TO PHQ9 QUESTIONS 1 & 2: 3
10. IF YOU CHECKED OFF ANY PROBLEMS, HOW DIFFICULT HAVE THESE PROBLEMS MADE IT FOR YOU TO DO YOUR WORK, TAKE CARE OF THINGS AT HOME, OR GET ALONG WITH OTHER PEOPLE: SOMEWHAT DIFFICULT
4. FEELING TIRED OR HAVING LITTLE ENERGY: NEARLY EVERY DAY
5. POOR APPETITE OR OVEREATING: SEVERAL DAYS
7. TROUBLE CONCENTRATING ON THINGS, SUCH AS READING THE NEWSPAPER OR WATCHING TELEVISION: MORE THAN HALF THE DAYS
8. MOVING OR SPEAKING SO SLOWLY THAT OTHER PEOPLE COULD HAVE NOTICED. OR THE OPPOSITE, BEING SO FIGETY OR RESTLESS THAT YOU HAVE BEEN MOVING AROUND A LOT MORE THAN USUAL: NOT AT ALL
2. FEELING DOWN, DEPRESSED OR HOPELESS: MORE THAN HALF THE DAYS
SUM OF ALL RESPONSES TO PHQ QUESTIONS 1-9: 13
1. LITTLE INTEREST OR PLEASURE IN DOING THINGS: SEVERAL DAYS
SUM OF ALL RESPONSES TO PHQ QUESTIONS 1-9: 13
9. THOUGHTS THAT YOU WOULD BE BETTER OFF DEAD, OR OF HURTING YOURSELF: NOT AT ALL

## 2024-04-22 ASSESSMENT — LIFESTYLE VARIABLES
HOW OFTEN DO YOU HAVE A DRINK CONTAINING ALCOHOL: NEVER
HOW MANY STANDARD DRINKS CONTAINING ALCOHOL DO YOU HAVE ON A TYPICAL DAY: PATIENT DOES NOT DRINK

## 2024-04-24 ENCOUNTER — OUTSIDE SERVICES (OUTPATIENT)
Dept: FAMILY MEDICINE CLINIC | Age: 84
End: 2024-04-24

## 2024-04-24 DIAGNOSIS — E78.2 MIXED HYPERLIPIDEMIA: ICD-10-CM

## 2024-04-24 DIAGNOSIS — E11.9 CONTROLLED TYPE 2 DIABETES MELLITUS WITHOUT COMPLICATION, WITHOUT LONG-TERM CURRENT USE OF INSULIN (HCC): ICD-10-CM

## 2024-04-24 DIAGNOSIS — F03.90 PRESENILE DEMENTIA WITHOUT BEHAVIORAL DISTURBANCE (HCC): ICD-10-CM

## 2024-04-24 DIAGNOSIS — J01.90 ACUTE SINUSITIS, RECURRENCE NOT SPECIFIED, UNSPECIFIED LOCATION: Primary | ICD-10-CM

## 2024-04-24 DIAGNOSIS — J44.9 CHRONIC OBSTRUCTIVE PULMONARY DISEASE, UNSPECIFIED COPD TYPE (HCC): ICD-10-CM

## 2024-04-24 NOTE — PROGRESS NOTES
Medicare Annual Wellness Visit    Ani Horn is here for Medicare AWV    Assessment & Plan   Encounter for other general examination  Recommendations for Preventive Services Due: see orders and patient instructions/AVS.  Recommended screening schedule for the next 5-10 years is provided to the patient in written form: see Patient Instructions/AVS.     Return for Medicare Annual Wellness Visit in 1 year.     Subjective       Patient's complete Health Risk Assessment and screening values have been reviewed and are found in Flowsheets. The following problems were reviewed today and where indicated follow up appointments were made and/or referrals ordered.    Positive Risk Factor Screenings with Interventions:    Fall Risk:  Do you feel unsteady or are you worried about falling? : (!) yes  2 or more falls in past year?: no  Fall with injury in past year?: no     Interventions:    Reviewed medications, home hazards, visual acuity, and co-morbidities that can increase risk for falls     Depression:  PHQ-2 Score: 3  PHQ-9 Total Score: 13    Interpretation:  5-9 mild   10-14 moderate   15-19 moderately severe   20-27 severe     Interventions:  Patient advised to follow-up in this office for further evaluation and treatment       Controlled Medication Review:      Today's Pain Level: No data recorded   Opioid Risk: (Low risk score <55) Opioid risk score: 4    Patient is low risk for opioid use disorder or overdose.    Last PDMP Francis as Reviewed:  Review User Review Instant Review Result   LOVELY VINCENT 10/11/2023  8:33 PM     Reviewed PDMP [1]          Activity, Diet, and Weight:  On average, how many days per week do you engage in moderate to strenuous exercise (like a brisk walk)?: 2 days  On average, how many minutes do you engage in exercise at this level?: 20 min    Do you eat balanced/healthy meals regularly?: Yes    Body mass index is 33.28 kg/m². (!) Abnormal    Obesity Interventions:  Patient declines

## 2024-04-24 NOTE — PROGRESS NOTES
4/24/2024    Ani Horn  1940    This resident is being seen today for an acute evaluation visit.  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  She is a 84 y.o. female resident who is being seen today for an acute evaluation per the request of staffing secondary to symptoms consistent with cold/sinus infection.  Staff indicates that she has had some degree of a moist cough with rhinorrhea, congestion, muscle aches and fatigue and that they have been treating her symptoms with Coricidin and Robitussin and it has been somewhat effective.  She states to me today that she has been sneezing and coughing for at least the course of the past 3 days.  She furthermore states that she has had a cough with phlegm which she describes as a yellowish-brown and that she had a sore throat but it did resolve.  She does admit to the postnasal drip with nasal congestion and states that she has shortness of breath which is chronic in nature.  She has no chest pain, stomach pain, nausea or vomiting, constipation or diarrhea, fever or chills, syncopal events or seizure activity.      Medications:  Antiacid chewable 750 mg daily  Atorvastatin 40 mg at bedtime  Cetirizine/PSE 5-120 mg at bedtime  Eucerin lotion to the feet bilaterally twice daily  Fenofibrate 160 mg daily  Fluticasone 500/51 puff twice daily  Freestyle brynn  Hydrocodone/APAP 5-325 mg twice daily  Melatonin 3 mg at bedtime  Metformin 1000 mg twice daily  Metoprolol

## 2024-04-24 NOTE — PATIENT INSTRUCTIONS
review.    Other Preventive Recommendations:    A preventive eye exam performed by an eye specialist is recommended every 1-2 years to screen for glaucoma; cataracts, macular degeneration, and other eye disorders.  A preventive dental visit is recommended every 6 months.  Try to get at least 150 minutes of exercise per week or 10,000 steps per day on a pedometer .  Order or download the FREE \"Exercise & Physical Activity: Your Everyday Guide\" from [employer]. Call 1-810.449.5280 or search [employer] online.  You need 7434-7147 mg of calcium and 1258-9577 IU of vitamin D per day. It is possible to meet your calcium requirement with diet alone, but a vitamin D supplement is usually necessary to meet this goal.  When exposed to the sun, use a sunscreen that protects against both UVA and UVB radiation with an SPF of 30 or greater. Reapply every 2 to 3 hours or after sweating, drying off with a towel, or swimming.  Always wear a seat belt when traveling in a car. Always wear a helmet when riding a bicycle or motorcycle.

## 2024-06-05 ENCOUNTER — OUTSIDE SERVICES (OUTPATIENT)
Dept: FAMILY MEDICINE CLINIC | Age: 84
End: 2024-06-05

## 2024-06-05 DIAGNOSIS — K21.9 GASTROESOPHAGEAL REFLUX DISEASE, UNSPECIFIED WHETHER ESOPHAGITIS PRESENT: ICD-10-CM
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
## 2024-06-05 DIAGNOSIS — M54.50 CHRONIC MIDLINE LOW BACK PAIN WITHOUT SCIATICA: ICD-10-CM

## 2024-06-05 DIAGNOSIS — N32.81 OVERACTIVE BLADDER: ICD-10-CM

## 2024-06-05 DIAGNOSIS — G47.00 INSOMNIA, UNSPECIFIED TYPE: ICD-10-CM

## 2024-06-05 DIAGNOSIS — G89.29 CHRONIC MIDLINE LOW BACK PAIN WITHOUT SCIATICA: ICD-10-CM

## 2024-06-06 NOTE — PROGRESS NOTES
6/5/2024    Ani Horn  1940    This resident is being seen today for a follow-up evaluation visit.  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  She is a 84 y.o. female resident who is being seen today for a follow-up evaluation with which this resident did have a recent fall in the bathroom and has some residual bruising noted to the bilateral knees, left eyebrow, forehead, right shoulder and to the inner bridge of her nose bilaterally.  Staff does indicate that she oftentimes complains more of pain in her lower back and they do maintain the benefit of Norco twice a day as well as as needed and that she has been utilizing Voltaren gel with Tylenol.  They did complete an x-ray of the lumbar sacral spine on 5/11/2024 which showed no fracture or acute findings.  She is a resident who has some underlying insomnia and continues with the benefit of melatonin and continues to be monitored with respect to blood sugar management with blood sugars 4 times a day and continues with metformin therapy.  She does have some incontinence of her bladder which she is able to self manage.  On today's evaluation she states that she does have some dizziness with standing but it is very quick to resolve.  She states that she still has some ongoing weakness since all of her underlying issues which began January 7 when she had a fall in her independent living setting.  She did spend 11 and half weeks in [facility]

## 2024-07-11 ENCOUNTER — OUTSIDE SERVICES (OUTPATIENT)
Dept: FAMILY MEDICINE CLINIC | Age: 84
End: 2024-07-11

## 2024-07-11 DIAGNOSIS — E11.9 CONTROLLED TYPE 2 DIABETES MELLITUS WITHOUT COMPLICATION, WITHOUT LONG-TERM CURRENT USE OF INSULIN (HCC): ICD-10-CM

## 2024-07-11 DIAGNOSIS — G47.00 INSOMNIA, UNSPECIFIED TYPE: Primary | ICD-10-CM

## 2024-07-11 DIAGNOSIS — M19.90 OSTEOARTHRITIS, UNSPECIFIED OSTEOARTHRITIS TYPE, UNSPECIFIED SITE: ICD-10-CM

## 2024-07-11 DIAGNOSIS — F03.90 PRESENILE DEMENTIA WITHOUT BEHAVIORAL DISTURBANCE (HCC): ICD-10-CM

## 2024-07-11 DIAGNOSIS — E78.2 MIXED HYPERLIPIDEMIA: ICD-10-CM

## 2024-07-11 NOTE — PROGRESS NOTES
7/11/2024    Ani Horn  1940    This resident is being seen today for a follow-up evaluation visit.  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  She is a 84 y.o. female resident who is being seen today for a follow-up visit with which this resident has had some back pain but her prednisone with Norco and diclofenac gel have been effective.  She is a resident who continues to have her blood sugars checked accordingly 4 times daily does maintain the benefit of metformin.  Staff states that she does have some underlying OCD with respect to cleanliness but it is controlled.  She has some ongoing bladder incontinence which she is able to manage it herself but unfortunately tends to wear 2 pull-ups into pad same time.  She does remain under assessment for an area to the right outer heel staff applying Bactroban with Mepilex treatment and it has been improving.  She has no complaints at this time outside of what she described as doing some self observation.  She states that she has been trying to focus more on herself and things that she may need and states that she feels that she needs to walk more and drink more water.  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  She further more states that she has what she would describe as an underactive bladder but staff does indicate that she most definitely has an overactive bladder.

## 2024-08-28 DIAGNOSIS — M19.90 OSTEOARTHRITIS, UNSPECIFIED OSTEOARTHRITIS TYPE, UNSPECIFIED SITE: Primary | ICD-10-CM

## 2024-08-28 RX ORDER — HYDROCODONE BITARTRATE AND ACETAMINOPHEN 5; 325 MG/1; MG/1
1 TABLET ORAL 2 TIMES DAILY
Qty: 180 TABLET | Refills: 0 | Status: SHIPPED | OUTPATIENT
Start: 2024-08-28 | End: 2024-11-26

## 2024-09-11 ENCOUNTER — OUTSIDE SERVICES (OUTPATIENT)
Dept: FAMILY MEDICINE CLINIC | Age: 84
End: 2024-09-11
Payer: MEDICARE

## 2024-09-11 DIAGNOSIS — E78.2 MIXED HYPERLIPIDEMIA: ICD-10-CM

## 2024-09-11 DIAGNOSIS — G47.00 INSOMNIA, UNSPECIFIED TYPE: Primary | ICD-10-CM

## 2024-09-11 DIAGNOSIS — K21.9 GASTROESOPHAGEAL REFLUX DISEASE, UNSPECIFIED WHETHER ESOPHAGITIS PRESENT: ICD-10-CM

## 2024-09-11 DIAGNOSIS — R11.0 NAUSEA: ICD-10-CM

## 2024-09-11 DIAGNOSIS — N32.81 OVERACTIVE BLADDER: ICD-10-CM

## 2024-09-11 PROCEDURE — 99349 HOME/RES VST EST MOD MDM 40: CPT | Performed by: NURSE PRACTITIONER

## 2024-09-24 DIAGNOSIS — M19.90 OSTEOARTHRITIS, UNSPECIFIED OSTEOARTHRITIS TYPE, UNSPECIFIED SITE: ICD-10-CM

## 2024-09-24 RX ORDER — HYDROCODONE BITARTRATE AND ACETAMINOPHEN 5; 325 MG/1; MG/1
1 TABLET ORAL 2 TIMES DAILY
Qty: 180 TABLET | Refills: 0 | Status: SHIPPED | OUTPATIENT
Start: 2024-09-24 | End: 2024-12-23

## 2024-09-25 ENCOUNTER — OUTSIDE SERVICES (OUTPATIENT)
Dept: PRIMARY CARE CLINIC | Age: 84
End: 2024-09-25
Payer: MEDICARE

## 2024-09-25 DIAGNOSIS — K30 UPSET STOMACH: Primary | ICD-10-CM

## 2024-09-25 DIAGNOSIS — N32.81 OVERACTIVE BLADDER: ICD-10-CM

## 2024-09-25 DIAGNOSIS — E78.2 MIXED HYPERLIPIDEMIA: ICD-10-CM

## 2024-09-25 DIAGNOSIS — F51.01 PRIMARY INSOMNIA: ICD-10-CM

## 2024-09-25 DIAGNOSIS — F03.90 PRESENILE DEMENTIA WITHOUT BEHAVIORAL DISTURBANCE (HCC): ICD-10-CM

## 2024-09-25 PROCEDURE — 99349 HOME/RES VST EST MOD MDM 40: CPT | Performed by: NURSE PRACTITIONER

## 2024-10-03 ENCOUNTER — OUTSIDE SERVICES (OUTPATIENT)
Dept: PRIMARY CARE CLINIC | Age: 84
End: 2024-10-03
Payer: MEDICARE

## 2024-10-03 DIAGNOSIS — M15.0 PRIMARY OSTEOARTHRITIS INVOLVING MULTIPLE JOINTS: Primary | ICD-10-CM

## 2024-10-03 DIAGNOSIS — J44.9 CHRONIC OBSTRUCTIVE PULMONARY DISEASE, UNSPECIFIED COPD TYPE (HCC): ICD-10-CM

## 2024-10-03 DIAGNOSIS — E11.9 TYPE 2 DIABETES MELLITUS WITHOUT COMPLICATION, WITHOUT LONG-TERM CURRENT USE OF INSULIN (HCC): ICD-10-CM

## 2024-10-03 DIAGNOSIS — F43.21 SITUATIONAL DEPRESSION: ICD-10-CM

## 2024-10-03 DIAGNOSIS — F03.90 PRESENILE DEMENTIA WITHOUT BEHAVIORAL DISTURBANCE (HCC): ICD-10-CM

## 2024-10-03 PROCEDURE — 99349 HOME/RES VST EST MOD MDM 40: CPT | Performed by: NURSE PRACTITIONER

## 2024-10-03 NOTE — PROGRESS NOTES
10/3/2024    Ani Horn  1940    This resident is being seen today for a follow-up evaluation visit.  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  She is a 84 y.o. female resident who is being seen today for a follow-up evaluation who remains under assessment for insomnia and continues with low-dose melatonin in this regard.  Staff does indicate she has some obsessive-compulsive disorders and she does vashti.  However, she always indicates that she has a lot of cleaning to do when I am in there.  Staff does continue to monitor her blood sugars accordingly which tend to be stable in the morning but higher toward the afternoon hours.  It is of note to mention that she was recently placed on low-dose Remeron per the recommendations of Dr. Glez and has had an increase in her appetite was actually up at 3:00 in the morning eating tomato soup.  She remains under assessment for bladder incontinence and continues with the benefit of depends and pads along with Vesicare to help manage her symptoms.  She does have chronic pain which does appear to be controlled with the benefit of prednisone, Norco and Voltaren gel.  She states that this time she has ongoing pain to her back and her left lower extremity but states that she does not feel the need to have Voltaren to her right arm/shoulder routinely but only as needed.  She does however want to continue with her routine Voltaren to her other extremity/low back.  She

## 2024-11-06 ENCOUNTER — OUTSIDE SERVICES (OUTPATIENT)
Dept: PRIMARY CARE CLINIC | Age: 84
End: 2024-11-06

## 2024-11-06 DIAGNOSIS — F03.90 PRESENILE DEMENTIA WITHOUT BEHAVIORAL DISTURBANCE (HCC): ICD-10-CM

## 2024-11-06 DIAGNOSIS — G47.10 SLEEPING EXCESSIVE: Primary | ICD-10-CM

## 2024-11-06 DIAGNOSIS — N32.81 OVERACTIVE BLADDER: ICD-10-CM

## 2024-11-06 DIAGNOSIS — L98.8 SKIN PLAQUE: ICD-10-CM

## 2024-11-06 DIAGNOSIS — E78.2 MIXED HYPERLIPIDEMIA: ICD-10-CM

## 2024-11-06 NOTE — PROGRESS NOTES
daily  Tamsulosin 0.4 mg daily  Vitamin B1 50 mg daily  Vitamin C 500 mg daily  Vitamin D2 50,000 units weekly  Vitamin D3 2000 units daily  Vitamin E 400 units daily  Vesicare 5 mg daily  Voltaren gel 1% 2 g to the right shoulder 3 times daily  Voltaren gel 4 g to the left knee twice daily  Carafate 1 g before meals and at bedtime        Objective     Vital Signs: /80 pulse 84 respirations 18 temperature 98.4 O2 95% weight 169.8 pounds        Physical examination:Skin is essentially warm and dry.  HEENT unremarkable. Neck is supple. Heart regular rate and rhythm. No rubs, gallops or murmurs noted.  Lungs are clear to auscultation.  No evidence of rhonchi, rales, or wheezing. Abdomen is soft, supple and non-tender.  Bowel sounds are noted x4 quadrants. No rigidity, guarding or rebound tenderness.  Negative Pearson's, negative McBurney's, negative Chris's.  Extremities; she does have some degree of venous insufficiency without  true pitting edema.  She does have some degree of hemosiderin deposition to the lower right foot and shin.  Pulses are adequate. No clubbing  or no cyanosis noted.  Neurologically she  is alert and oriented x3.  No evidence of paralysis or paresthesias noted.    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  Regarding her plaquing to her right foot and shin I am going to recommend Dreft soap soaks daily.  Furthermore, she did have concerns regarding the fact that she sleeps too much so I will downward

## 2024-11-20 ENCOUNTER — OUTSIDE SERVICES (OUTPATIENT)
Dept: PRIMARY CARE CLINIC | Age: 84
End: 2024-11-20
Payer: MEDICARE

## 2024-11-20 DIAGNOSIS — F51.01 PRIMARY INSOMNIA: ICD-10-CM
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
## 2024-11-20 DIAGNOSIS — R07.89 OTHER CHEST PAIN: Primary | ICD-10-CM

## 2024-11-20 DIAGNOSIS — I31.39 PERICARDIAL EFFUSION: ICD-10-CM

## 2024-11-20 DIAGNOSIS — F43.21 SITUATIONAL DEPRESSION: ICD-10-CM

## 2024-11-20 PROCEDURE — 99349 HOME/RES VST EST MOD MDM 40: CPT | Performed by: NURSE PRACTITIONER

## 2024-11-20 NOTE — PROGRESS NOTES
chronic depression and remains under assessment for incontinence of her bladder with which she utilizes depends/pads.      Medications:  Antiacid chewable 750 mg daily  Atorvastatin 40 mg at bedtime  Cetirizine/PSE 5-120 mg at bedtime as needed  Eucerin lotion to the feet bilaterally twice daily  Fenofibrate 160 mg daily  Fluticasone 500/51 puff twice daily  Freestyle brynn  Hydrocodone/APAP 5-325 mg twice daily  Melatonin 3 mg at bedtime  Metformin 1000 mg twice daily  Metoprolol succinate 25 mg ER daily  Montelukast 10 mg daily  Pantoprazole 40 mg daily  Prednisone 5 mg daily  Remeron 7.5 mg at bedtime  Senexon S 8.6-50 mg twice daily  Tamsulosin 0.4 mg daily  Vitamin B1 50 mg daily  Vitamin C 500 mg daily  Vitamin D2 50,000 units weekly  Vitamin D3 2000 units daily  Vitamin E 400 units daily  Vesicare 5 mg daily  Voltaren gel 1% 2 g to the right shoulder 3 times daily  Voltaren gel 4 g to the left knee twice daily  Carafate 1 g before meals and at bedtime  Spironolactone 25 mg daily      Objective     Vital Signs: /80 pulse 84 respirations 18 temperature 98.4 O2 95% weight 169.8 pounds        Physical examination:Skin is essentially warm and dry.  HEENT unremarkable. Neck is supple. Heart regular rate and rhythm. No rubs, gallops or murmurs noted.  Lungs are clear to auscultation.  No evidence of rhonchi, rales, or wheezing. Abdomen is soft, supple and non-tender.  Bowel sounds are noted x4 quadrants. No rigidity, guarding or rebound tenderness.  Negative Pearson's, negative McBurney's, negative Chris's.  Extremities; she does have some degree of venous insufficiency without  true pitting edema.  She does have some degree of hemosiderin deposition to the lower right foot and shin.  Pulses are adequate. No clubbing  or no cyanosis noted.  Neurologically she  is alert and oriented x3.  No evidence of paralysis or paresthesias noted.    Diagnoses and all orders for this visit:    Other chest

## 2024-12-16 ENCOUNTER — OUTSIDE SERVICES (OUTPATIENT)
Dept: PRIMARY CARE CLINIC | Age: 84
End: 2024-12-16

## 2024-12-16 DIAGNOSIS — N32.81 OVERACTIVE BLADDER: ICD-10-CM

## 2024-12-16 DIAGNOSIS — L30.9 DERMATITIS: Primary | ICD-10-CM

## 2024-12-16 DIAGNOSIS — E78.2 MIXED HYPERLIPIDEMIA: ICD-10-CM

## 2024-12-16 DIAGNOSIS — J44.9 CHRONIC OBSTRUCTIVE PULMONARY DISEASE, UNSPECIFIED COPD TYPE (HCC): ICD-10-CM

## 2024-12-16 DIAGNOSIS — F03.90 PRESENILE DEMENTIA WITHOUT BEHAVIORAL DISTURBANCE (HCC): ICD-10-CM

## 2024-12-16 NOTE — PROGRESS NOTES
12/16/2024    Ani Horn  1940    This resident is being seen today for an acute evaluation visit.  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  She is a 84 y.o. female resident who is being seen today for an acute evaluation with which this resident has some concern regarding her bilateral lower extremities with respect to what appears to be some increased redness with irritation/itching.  It is of note to mention that I had seen this resident in the recent weeks past and felt that she had some dry skin and I had placed her on Eucerin cream which was later upward titrated to twice daily as per the recommendations of Dr. Glez.  She states that she only has some major itching noted to the inner aspect of her knee on the right side.  She has no pain associated with it.  It has not had any further spreading.  She furthermore has no complaints regarding any headaches or dizziness, sore throat, chest pain, coughing or shortness of breath, nausea or vomiting, constipation or diarrhea, fever or chills, falls or syncopal events.          Medications:  Antiacid chewable 750 mg daily  Atorvastatin 40 mg at bedtime  Cetirizine/PSE 5-120 mg at bedtime as needed  Eucerin lotion to the feet bilaterally twice daily  Fenofibrate 160 mg daily  Fluticasone 500/51 puff twice daily  Freestyle brynn  Hydrocodone/APAP 5-325 mg twice daily  Melatonin 3 mg at bedtime  Metformin 1000 mg twice daily  Metoprolol succinate 25 mg ER

## 2025-01-20 ENCOUNTER — OUTSIDE SERVICES (OUTPATIENT)
Dept: PRIMARY CARE CLINIC | Age: 85
End: 2025-01-20
Payer: MEDICARE

## 2025-01-20 DIAGNOSIS — E11.9 TYPE 2 DIABETES MELLITUS WITHOUT COMPLICATION, WITHOUT LONG-TERM CURRENT USE OF INSULIN (HCC): ICD-10-CM

## 2025-01-20 DIAGNOSIS — F03.90 PRESENILE DEMENTIA WITHOUT BEHAVIORAL DISTURBANCE (HCC): ICD-10-CM

## 2025-01-20 DIAGNOSIS — F43.21 SITUATIONAL DEPRESSION: ICD-10-CM

## 2025-01-20 DIAGNOSIS — F51.01 PRIMARY INSOMNIA: Primary | ICD-10-CM

## 2025-01-20 DIAGNOSIS — J44.9 CHRONIC OBSTRUCTIVE PULMONARY DISEASE, UNSPECIFIED COPD TYPE (HCC): ICD-10-CM

## 2025-01-20 PROCEDURE — 99349 HOME/RES VST EST MOD MDM 40: CPT | Performed by: NURSE PRACTITIONER

## 2025-01-20 NOTE — PROGRESS NOTES
1/20/2025    Ani Horn  1940    This resident is being seen today for a follow-up evaluation visit.  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  She is a 84 y.o. female resident who is being seen today for a evaluation with this resident under assessment for a rash to the bilateral lower extremities which has resolved after treatment with a prednisone taper and Kenalog Cream.  She did have recent upward titration of her Vesicare which she has tolerated well.  She has been under assessment for insomnia and does maintain Melatonin in this regard.  She furthermore remains under assessment for depression which has been stable.  She does have underlying changes with respect to her strength but Staff indicates that she does lay in bed quite often.  She has recommendations for a 2D echo on 1/22/2025 per the recommendations of cardiology with Dr. Quispe with which she will follow-up on 1/29/2025 which is a reevaluation from a pericardial effusion from November 2024.  She has ongoing incontinence of her bladder which she does utilize depends and staff indicates she has an open area to the left fifth digit of her foot which they have been utilizing a triple antibiotic with a Band-Aid and underlies she does have some dry skin to the bilateral lower extremities which they say Aquaphor has been effective for her.  She has underlying chronic pain which she utilizes Norco with prednisone and Voltaren.  Staff does states

## 2025-02-12 ENCOUNTER — OUTSIDE SERVICES (OUTPATIENT)
Dept: PRIMARY CARE CLINIC | Age: 85
End: 2025-02-12
Payer: MEDICARE

## 2025-02-12 DIAGNOSIS — R06.02 SHORTNESS OF BREATH: Primary | ICD-10-CM

## 2025-02-12 DIAGNOSIS — R07.89 CHEST HEAVINESS: ICD-10-CM

## 2025-02-12 DIAGNOSIS — N32.81 OVERACTIVE BLADDER: ICD-10-CM

## 2025-02-12 DIAGNOSIS — E78.2 MIXED HYPERLIPIDEMIA: ICD-10-CM

## 2025-02-12 DIAGNOSIS — E11.9 TYPE 2 DIABETES MELLITUS WITHOUT COMPLICATION, WITHOUT LONG-TERM CURRENT USE OF INSULIN (HCC): ICD-10-CM

## 2025-02-12 PROCEDURE — 99349 HOME/RES VST EST MOD MDM 40: CPT | Performed by: NURSE PRACTITIONER

## 2025-02-12 NOTE — PROGRESS NOTES
2/12/2025    Ani K Kory  1940    This resident is being seen today for a follow-up evaluation visit.  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  She is a 84 y.o. female resident who is being seen today for a follow-up visit with this resident wanting to discuss more about possible underlying asthma and the fact that she feels that maybe her medications are working as they did in times past.  She states that she gets a heavy feeling in her chest at least 2-3 times per week and indicates that she is not much of an active person so this is mostly at rest.  She states that she does become short of breath with the heaviness and she was recently seen in conjunction with Dr. Quispe with the department of cardiology and had an echocardiogram completed 1/29/2025 which does still show pericardial effusion with heart failure with preserved ejection fraction.  She states that she had previously been seen in conjunction with pulmonology but they have retired in the years past.  She admits to cough but states that it has improved but states that her throat is somewhat \"raspy.\"  She has no nausea or vomiting, constipation or diarrhea, fever or chills, falls or syncopal events.      Medications:  Antiacid chewable 750 mg daily  Aquaphor ointment to the bilateral lower extremities  Atorvastatin 40 mg at bedtime  Diclofenac gel to the left knee as needed  Diclofenac gel 1% to the lower back twice daily  Fenofibrate 160 mg

## 2025-02-21 DIAGNOSIS — M19.90 OSTEOARTHRITIS, UNSPECIFIED OSTEOARTHRITIS TYPE, UNSPECIFIED SITE: ICD-10-CM

## 2025-02-21 RX ORDER — HYDROCODONE BITARTRATE AND ACETAMINOPHEN 5; 325 MG/1; MG/1
1 TABLET ORAL 2 TIMES DAILY
Qty: 60 TABLET | Refills: 0 | Status: SHIPPED | OUTPATIENT
Start: 2025-02-21 | End: 2025-03-23

## 2025-02-21 NOTE — TELEPHONE ENCOUNTER
Name of Medication(s) Requested:  Requested Prescriptions      No prescriptions requested or ordered in this encounter       Medication is on current medication list Yes    Dosage and directions were verified? Yes    Quantity verified: 30 day supply     Pharmacy Verified?  Yes    Last Appointment:  Visit date not found    Future appts:  No future appointments.     (If no appt send self scheduling link. .REFILLAPPT)  Scheduling request sent?     [] Yes  [x] No    Does patient need updated?  [] Yes  [x] No

## 2025-03-03 ENCOUNTER — OUTSIDE SERVICES (OUTPATIENT)
Dept: PRIMARY CARE CLINIC | Age: 85
End: 2025-03-03
Payer: MEDICARE

## 2025-03-03 DIAGNOSIS — F03.90 PRESENILE DEMENTIA WITHOUT BEHAVIORAL DISTURBANCE (HCC): ICD-10-CM

## 2025-03-03 DIAGNOSIS — R05.1 ACUTE COUGH: Primary | ICD-10-CM

## 2025-03-03 DIAGNOSIS — F43.21 SITUATIONAL DEPRESSION: ICD-10-CM

## 2025-03-03 DIAGNOSIS — R07.89 CHEST HEAVINESS: ICD-10-CM

## 2025-03-03 DIAGNOSIS — F51.01 PRIMARY INSOMNIA: ICD-10-CM

## 2025-03-03 PROCEDURE — 99349 HOME/RES VST EST MOD MDM 40: CPT | Performed by: NURSE PRACTITIONER

## 2025-03-03 NOTE — PROGRESS NOTES
3/3/2025    Ani Horn  1940    This resident is being seen today for a follow-up evaluation visit.  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  She is a 85 y.o. female resident who is being seen today for a follow-up evaluation with which this resident does remain under assessment for depression.  She furthermore has insomnia which is controlled with the benefit of melatonin.  Staff does indicate that she has decreased drink but she does frequently lay in bed.  She does have bladder incontinence which she is able to manage on her own with the benefit of depends/pads.  On my last evaluation, this resident was complaining of chest pain and I had asked staffing to contact the department of cardiology/Dr. Quispe and determine if he would be on board with initiation of Imdur.  However, he did ask them just to monitor her for now and to call the office if her symptoms should worsen and they would move up her appointment to sooner appointment if needed with her next follow-up appointment in July with a 2D echo scheduled at that time.  At this point, she just admits to the insomnia and just having cold like symptoms over the course of the past month.  She states that she has had a ongoing moist cough with green phlegm production and she is short of breath from time to time.  She has no chest pain, stomach pain, nausea or vomiting, constipation or diarrhea, fever or chills, falls or syncopal

## 2025-04-02 ENCOUNTER — OUTSIDE SERVICES (OUTPATIENT)
Dept: PRIMARY CARE CLINIC | Age: 85
End: 2025-04-02
Payer: MEDICARE

## 2025-04-02 DIAGNOSIS — N32.81 OVERACTIVE BLADDER: ICD-10-CM

## 2025-04-02 DIAGNOSIS — E78.2 MIXED HYPERLIPIDEMIA: ICD-10-CM

## 2025-04-02 DIAGNOSIS — F03.90 PRESENILE DEMENTIA WITHOUT BEHAVIORAL DISTURBANCE (HCC): ICD-10-CM

## 2025-04-02 DIAGNOSIS — R11.0 NAUSEA: Primary | ICD-10-CM

## 2025-04-02 DIAGNOSIS — R63.4 WEIGHT LOSS: ICD-10-CM

## 2025-04-02 PROCEDURE — 99349 HOME/RES VST EST MOD MDM 40: CPT | Performed by: NURSE PRACTITIONER

## 2025-04-02 NOTE — PROGRESS NOTES
4/2/2025    Ani Horn  1940    This resident is being seen today for a follow-up evaluation visit.  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  She is a 85 y.o. female resident who is being seen today for a follow-up visit with which this resident has been under assessment for insomnia and has been given the benefit of melatonin at bedtime and furthermore has some underlying depression which is well-managed.  Staff has noted that she has decreased strength as she does frequently tend to lay in bed she does remain incontinent of her bladder which she is able to self manage with depends.  She does have some chronic underlying pain which Staff indicates is managed with Norco along with prednisone and Voltaren gel.  She states that this time that she has had some underlying nausea related symptoms with which has led to weight loss as she does not want to eat, especially meat.  She does admit to having a history of a hiatal hernia but states that she does not really want intervention outside of medication management at this time.  Furthermore, she has recommendations to follow-up with Dr. Quispe Department of Cardiology in the next several months.  She currently complains of no headaches or dizziness, sore throat, chest pain, coughing or shortness of breath, nausea or vomiting, constipation or diarrhea, fever or chills, falls or syncopal events.        Medications:  Antiacid chewable 750 mg daily  Aquaphor

## 2025-04-24 DIAGNOSIS — M19.90 OSTEOARTHRITIS, UNSPECIFIED OSTEOARTHRITIS TYPE, UNSPECIFIED SITE: ICD-10-CM

## 2025-04-24 NOTE — TELEPHONE ENCOUNTER
Name of Medication(s) Requested:  Requested Prescriptions     Pending Prescriptions Disp Refills    HYDROcodone-acetaminophen (NORCO) 5-325 MG per tablet 60 tablet 0     Sig: Take 1 tablet by mouth 2 times daily for 30 days. Max Daily Amount: 2 tablets       Medication is on current medication list Yes    Dosage and directions were verified? Yes    Quantity verified: 30 day supply     Pharmacy Verified?  Yes    Last Appointment:  Visit date not found    Future appts:  No future appointments.     (If no appt send self scheduling link. .REFILLAPPT)  Scheduling request sent?     [] Yes  [x] No    Does patient need updated?  [] Yes  [x] No

## 2025-04-25 RX ORDER — HYDROCODONE BITARTRATE AND ACETAMINOPHEN 5; 325 MG/1; MG/1
1 TABLET ORAL 2 TIMES DAILY
Qty: 60 TABLET | Refills: 0 | Status: SHIPPED | OUTPATIENT
Start: 2025-04-25 | End: 2025-05-25

## 2025-05-23 DIAGNOSIS — M19.90 OSTEOARTHRITIS, UNSPECIFIED OSTEOARTHRITIS TYPE, UNSPECIFIED SITE: ICD-10-CM

## 2025-05-23 RX ORDER — HYDROCODONE BITARTRATE AND ACETAMINOPHEN 5; 325 MG/1; MG/1
1 TABLET ORAL 2 TIMES DAILY
Qty: 60 TABLET | Refills: 0 | Status: SHIPPED | OUTPATIENT
Start: 2025-05-23 | End: 2025-06-22

## 2025-05-23 NOTE — TELEPHONE ENCOUNTER
Name of Medication(s) Requested:  Requested Prescriptions     Pending Prescriptions Disp Refills    HYDROcodone-acetaminophen (NORCO) 5-325 MG per tablet 60 tablet 0     Sig: Take 1 tablet by mouth 2 times daily for 30 days. Max Daily Amount: 2 tablets       Medication is on current medication list Yes    Dosage and directions were verified? Yes    Quantity verified: 30 day supply     Pharmacy Verified?  Yes    Last Appointment:  Visit date not found    Future appts:  No future appointments.     (If no appt send self scheduling link. .REFILLAPPT)  Scheduling request sent?     [] Yes  [] No    Does patient need updated?  [] Yes  [] No

## 2025-06-09 ENCOUNTER — OUTSIDE SERVICES (OUTPATIENT)
Dept: PRIMARY CARE CLINIC | Age: 85
End: 2025-06-09

## 2025-06-09 DIAGNOSIS — E78.2 MIXED HYPERLIPIDEMIA: ICD-10-CM

## 2025-06-09 DIAGNOSIS — E11.9 TYPE 2 DIABETES MELLITUS WITHOUT COMPLICATION, WITHOUT LONG-TERM CURRENT USE OF INSULIN (HCC): ICD-10-CM

## 2025-06-09 DIAGNOSIS — R42 DIZZINESS: Primary | ICD-10-CM

## 2025-06-09 DIAGNOSIS — F51.01 PRIMARY INSOMNIA: ICD-10-CM

## 2025-06-09 DIAGNOSIS — R06.02 SHORTNESS OF BREATH: ICD-10-CM

## 2025-06-09 NOTE — PROGRESS NOTES
6/9/2025    Ani YANELY Kory  1940    This resident is being seen today for a follow-up evaluation visit.  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  She is a 85 y.o. female resident who is being seen today for a follow-up evaluation with which this resident does remain on low-dose Remeron for underlying depression with weight loss.  She furthermore maintains the benefit of melatonin for insomnia and has underlying OCD with cleanliness.  She has had some changes with respect to strength but staff states that that is due to the fact that she does tend to lay in bed.  She remains incontinent of her bladder and is able to self manage with depends and pads.  Staff states that her appetite has improved and her weights have been stable and she does continue with the Zofran daily.  She was under recent assessment for a rash to the right lower extremity which is healed after Kenalog treatment with prednisone therapy.  Staff continues to monitor her blood sugars weekly and she is scheduled to be seen in conjunction with Department of Cardiology with a 2D echo 6/27/2025 with a follow-up with Dr. Quispe on 7/8/2025.  She remains under assessment for chronic pain management and continues with the benefit of Norco along with prednisone and Voltaren.  She does admit to having some dizziness from time to time and admits that she is unsteady with the use of her walker.  She has no coughing or shortness of breath, chest pain,

## 2025-06-24 DIAGNOSIS — M19.90 OSTEOARTHRITIS, UNSPECIFIED OSTEOARTHRITIS TYPE, UNSPECIFIED SITE: Primary | ICD-10-CM

## 2025-06-24 RX ORDER — HYDROCODONE BITARTRATE AND ACETAMINOPHEN 5; 325 MG/1; MG/1
1 TABLET ORAL 2 TIMES DAILY
COMMUNITY
End: 2025-06-24 | Stop reason: SDUPTHER

## 2025-06-25 RX ORDER — HYDROCODONE BITARTRATE AND ACETAMINOPHEN 5; 325 MG/1; MG/1
1 TABLET ORAL 2 TIMES DAILY
Qty: 60 TABLET | Refills: 0 | Status: SHIPPED | OUTPATIENT
Start: 2025-06-25 | End: 2025-07-25

## 2025-07-02 DIAGNOSIS — G89.4 CHRONIC PAIN SYNDROME: Primary | ICD-10-CM

## 2025-07-03 RX ORDER — HYDROCODONE BITARTRATE AND ACETAMINOPHEN 5; 325 MG/1; MG/1
1 TABLET ORAL EVERY 8 HOURS PRN
Qty: 30 TABLET | Refills: 0 | Status: SHIPPED | OUTPATIENT
Start: 2025-07-03 | End: 2025-07-13